# Patient Record
Sex: MALE | Race: BLACK OR AFRICAN AMERICAN | NOT HISPANIC OR LATINO | Employment: OTHER | URBAN - METROPOLITAN AREA
[De-identification: names, ages, dates, MRNs, and addresses within clinical notes are randomized per-mention and may not be internally consistent; named-entity substitution may affect disease eponyms.]

---

## 2019-04-22 ENCOUNTER — OFFICE VISIT (OUTPATIENT)
Dept: WOUND CARE | Facility: CLINIC | Age: 64
End: 2019-04-22
Payer: MEDICARE

## 2019-04-22 VITALS — BODY MASS INDEX: 19.53 KG/M2 | WEIGHT: 136.44 LBS | HEIGHT: 70 IN

## 2019-04-22 DIAGNOSIS — L91.8 HYPERTROPHIC GRANULATION TISSUE: Primary | ICD-10-CM

## 2019-04-22 DIAGNOSIS — M54.9 BACK PAIN, UNSPECIFIED BACK LOCATION, UNSPECIFIED BACK PAIN LATERALITY, UNSPECIFIED CHRONICITY: ICD-10-CM

## 2019-04-22 DIAGNOSIS — Z43.3 ATTENTION TO COLOSTOMY: ICD-10-CM

## 2019-04-22 PROCEDURE — 3008F BODY MASS INDEX DOCD: CPT | Mod: CPTII,S$GLB,, | Performed by: CLINICAL NURSE SPECIALIST

## 2019-04-22 PROCEDURE — 99999 PR PBB SHADOW E&M-NEW PATIENT-LVL II: CPT | Mod: PBBFAC,,, | Performed by: CLINICAL NURSE SPECIALIST

## 2019-04-22 PROCEDURE — 3008F PR BODY MASS INDEX (BMI) DOCUMENTED: ICD-10-PCS | Mod: CPTII,S$GLB,, | Performed by: CLINICAL NURSE SPECIALIST

## 2019-04-22 PROCEDURE — 99999 PR PBB SHADOW E&M-NEW PATIENT-LVL II: ICD-10-PCS | Mod: PBBFAC,,, | Performed by: CLINICAL NURSE SPECIALIST

## 2019-04-22 PROCEDURE — 99203 OFFICE O/P NEW LOW 30 MIN: CPT | Mod: S$GLB,,, | Performed by: CLINICAL NURSE SPECIALIST

## 2019-04-22 PROCEDURE — 99203 PR OFFICE/OUTPT VISIT, NEW, LEVL III, 30-44 MIN: ICD-10-PCS | Mod: S$GLB,,, | Performed by: CLINICAL NURSE SPECIALIST

## 2019-04-22 NOTE — PROGRESS NOTES
"Subjective:       Patient ID: Fran Negrete is a 63 y.o. male.    Chief Complaint: Colostomy    This is new pt to [facility], [facility], had surgery originally 2013 for rectal cancer and this led to other complications and surgeries. and now has a perm colostomy, wounds are well healed. He is very pleasant and appreciative, wants to get his care here, he states, comes with a lot of paper copies. He is presently under care of Dr dumont , ([facility])    Review of Systems   Constitutional: Negative for activity change, appetite change and fever.   HENT: Negative.    Respiratory: Negative.    Cardiovascular: Negative.    Gastrointestinal: Negative for constipation.   Genitourinary: Negative.    Musculoskeletal: Negative.    Skin: Negative.        Objective:      Physical Exam   Constitutional: He appears well-developed.   Very thin but fit   Pulmonary/Chest: Effort normal and breath sounds normal.   Abdominal: Soft. Bowel sounds are normal.   Musculoskeletal: Normal range of motion.       4/22 he does have 2 very firm granulomas at base of stoma that are affecting his pouching and do hurt, topical lidocaine did not provide enough relief for me to cut these. Or cauterize,  He is seeing Dr Dumont this week so I have sent a note to see if she can remove in office  I applied Smaller pouch cut 3/4 " stoma, gave him few pouches already cut and he has olga new image at home but was cutting too big  [facility] for [facility]    Assessment:       1. Hypertrophic granulation tissue    2. Attention to colostomy    3. Back pain, unspecified back location, unspecified back pain laterality, unspecified chronicity        Plan:       Resized and fitted for smaller cut opening,   Request surgeon remove the very firm and large growths at base  Pt to return in few weeks   I have reviewed the plan of care with the patient  and he express understanding. I spent over 50% " of this 30 minute visit in face to face counseling.

## 2019-05-08 ENCOUNTER — OFFICE VISIT (OUTPATIENT)
Dept: WOUND CARE | Facility: CLINIC | Age: 64
End: 2019-05-08
Payer: MEDICARE

## 2019-05-08 DIAGNOSIS — Z43.3 ATTENTION TO COLOSTOMY: Primary | ICD-10-CM

## 2019-05-08 PROCEDURE — 99213 PR OFFICE/OUTPT VISIT, EST, LEVL III, 20-29 MIN: ICD-10-PCS | Mod: S$GLB,,, | Performed by: CLINICAL NURSE SPECIALIST

## 2019-05-08 PROCEDURE — 99999 PR PBB SHADOW E&M-EST. PATIENT-LVL II: CPT | Mod: PBBFAC,,, | Performed by: CLINICAL NURSE SPECIALIST

## 2019-05-08 PROCEDURE — 99213 OFFICE O/P EST LOW 20 MIN: CPT | Mod: S$GLB,,, | Performed by: CLINICAL NURSE SPECIALIST

## 2019-05-08 PROCEDURE — 99999 PR PBB SHADOW E&M-EST. PATIENT-LVL II: ICD-10-PCS | Mod: PBBFAC,,, | Performed by: CLINICAL NURSE SPECIALIST

## 2019-05-08 NOTE — PROGRESS NOTES
"Subjective:       Patient ID: Fran Negrete is a 63 y.o. male.    Chief Complaint: Colostomy    This is a fu  to Enterostomal Therapy clinic, and he had his issues resolved by Dr Dumont as we discussed, so back for another eval of colostomy needs.   PMH  [facility] is [facility] who is [facility] to other providers, had surgery originally 2013 for rectal cancer and this led to other complications and surgeries. and now has a perm colostomy, wounds are well healed. He is very pleasant and appreciative, wants to get his care here, he states, comes with a lot of paper copies. He is presently under care of Dr dumont , ([facility])    Review of Systems   Constitutional: Negative for activity change, appetite change and fever.   HENT: Negative.    Respiratory: Negative.    Cardiovascular: Negative.    Gastrointestinal: Negative for constipation.   Genitourinary: Negative.    Musculoskeletal: Negative.    Skin: Negative.        Objective:      Physical Exam   Constitutional: He appears well-developed.   Very thin but fit   Pulmonary/Chest: Effort normal and breath sounds normal.   Abdominal: Soft. Bowel sounds are normal.   Musculoskeletal: Normal range of motion.       4/22 5/8/19 post removal of granulomas      I applied pouch cut 3/4 " stoma, he has some at home and says [facility] him, gave him numbers [id number]   He plans to get [facility] for now but if cannot to call me and I will send script to [facility]    Assessment:       1. Attention to colostomy        Plan:       Resized and fitted for smaller cut opening,   FU as needed,  I have reviewed the plan of care with the patient  and he express understanding. I spent over 50% of this 15 minute visit in face to face counseling.       "

## 2020-05-06 PROBLEM — Z43.3 ATTENTION TO COLOSTOMY: Status: RESOLVED | Noted: 2019-04-22 | Resolved: 2020-05-06

## 2020-09-21 ENCOUNTER — TELEPHONE (OUTPATIENT)
Dept: WOUND CARE | Facility: CLINIC | Age: 65
End: 2020-09-21

## 2020-09-21 NOTE — TELEPHONE ENCOUNTER
----- Message from Lucy Jacome sent at 9/21/2020 11:49 AM CDT -----  Contact: Pt  @ [phone]  Pt would like to receive a call from [place].       Please contact Pt @ Home [phone] or Cell [phone]

## 2020-09-23 ENCOUNTER — TELEPHONE (OUTPATIENT)
Dept: WOUND CARE | Facility: CLINIC | Age: 65
End: 2020-09-23

## 2020-09-23 NOTE — TELEPHONE ENCOUNTER
Spoke with pt who is [facility] , he is being worked up for PVD there and advised to have a colonoscopy , he had questions about this today, we spoke about his current issues ,will [facility] at [facility] for now

## 2021-07-20 ENCOUNTER — TELEPHONE (OUTPATIENT)
Dept: ENDOSCOPY | Facility: HOSPITAL | Age: 66
End: 2021-07-20

## 2021-07-20 ENCOUNTER — OFFICE VISIT (OUTPATIENT)
Dept: WOUND CARE | Facility: CLINIC | Age: 66
End: 2021-07-20
Payer: MEDICARE

## 2021-07-20 DIAGNOSIS — Z85.048 HISTORY OF RECTAL CANCER: ICD-10-CM

## 2021-07-20 DIAGNOSIS — L92.9 GRANULOMA, SKIN: ICD-10-CM

## 2021-07-20 DIAGNOSIS — Z43.3 ATTENTION TO COLOSTOMY: Primary | ICD-10-CM

## 2021-07-20 PROCEDURE — 99214 OFFICE O/P EST MOD 30 MIN: CPT | Mod: S$GLB,,, | Performed by: CLINICAL NURSE SPECIALIST

## 2021-07-20 PROCEDURE — 99999 PR PBB SHADOW E&M-EST. PATIENT-LVL I: ICD-10-PCS | Mod: PBBFAC,,, | Performed by: CLINICAL NURSE SPECIALIST

## 2021-07-20 PROCEDURE — 99214 PR OFFICE/OUTPT VISIT, EST, LEVL IV, 30-39 MIN: ICD-10-PCS | Mod: S$GLB,,, | Performed by: CLINICAL NURSE SPECIALIST

## 2021-07-20 PROCEDURE — 99999 PR PBB SHADOW E&M-EST. PATIENT-LVL I: CPT | Mod: PBBFAC,,, | Performed by: CLINICAL NURSE SPECIALIST

## 2021-07-20 RX ORDER — OMEPRAZOLE 20 MG/1
20 CAPSULE, DELAYED RELEASE ORAL
COMMUNITY

## 2021-07-20 RX ORDER — NAPROXEN SODIUM 220 MG/1
TABLET, FILM COATED ORAL
COMMUNITY
Start: 2021-03-10

## 2021-07-20 RX ORDER — TADALAFIL 5 MG/1
1 TABLET ORAL DAILY
COMMUNITY
Start: 2021-05-14

## 2021-07-20 RX ORDER — VITAMIN E 268 MG
CAPSULE ORAL
COMMUNITY
Start: 2020-09-04

## 2021-07-20 RX ORDER — ATORVASTATIN CALCIUM 40 MG/1
TABLET, FILM COATED ORAL
COMMUNITY
Start: 2021-05-28

## 2021-08-23 DIAGNOSIS — Z01.812 PRE-PROCEDURE LAB EXAM: ICD-10-CM

## 2021-08-31 ENCOUNTER — PATIENT MESSAGE (OUTPATIENT)
Dept: ENDOSCOPY | Facility: HOSPITAL | Age: 66
End: 2021-08-31

## 2021-09-08 ENCOUNTER — TELEPHONE (OUTPATIENT)
Dept: ENDOSCOPY | Facility: HOSPITAL | Age: 66
End: 2021-09-08

## 2021-09-13 ENCOUNTER — TELEPHONE (OUTPATIENT)
Dept: ENDOSCOPY | Facility: HOSPITAL | Age: 66
End: 2021-09-13

## 2021-09-29 DIAGNOSIS — Z01.812 PRE-PROCEDURE LAB EXAM: ICD-10-CM

## 2021-10-15 ENCOUNTER — ANESTHESIA EVENT (OUTPATIENT)
Dept: ENDOSCOPY | Facility: HOSPITAL | Age: 66
End: 2021-10-15
Payer: MEDICARE

## 2021-10-15 ENCOUNTER — ANESTHESIA (OUTPATIENT)
Dept: ENDOSCOPY | Facility: HOSPITAL | Age: 66
End: 2021-10-15
Payer: MEDICARE

## 2021-10-15 ENCOUNTER — HOSPITAL ENCOUNTER (OUTPATIENT)
Facility: HOSPITAL | Age: 66
Discharge: HOME | End: 2021-10-15
Attending: COLON & RECTAL SURGERY | Admitting: COLON & RECTAL SURGERY
Payer: MEDICARE

## 2021-10-15 VITALS
DIASTOLIC BLOOD PRESSURE: 75 MMHG | OXYGEN SATURATION: 100 % | TEMPERATURE: 98 F | HEART RATE: 70 BPM | BODY MASS INDEX: 16.66 KG/M2 | HEIGHT: 71 IN | WEIGHT: 119 LBS | SYSTOLIC BLOOD PRESSURE: 121 MMHG | RESPIRATION RATE: 20 BRPM

## 2021-10-15 DIAGNOSIS — Z85.048 HISTORY OF RECTAL CANCER: Primary | ICD-10-CM

## 2021-10-15 DIAGNOSIS — C20 RECTAL CANCER: ICD-10-CM

## 2021-10-15 PROCEDURE — 37000009 HC ANESTHESIA EA ADD 15 MINS: Performed by: COLON & RECTAL SURGERY

## 2021-10-15 PROCEDURE — 44394 COLONOSCOPY W/SNARE: CPT | Mod: PT,,, | Performed by: COLON & RECTAL SURGERY

## 2021-10-15 PROCEDURE — 88305 TISSUE EXAM BY PATHOLOGIST: ICD-10-PCS | Mod: 26,,, | Performed by: PATHOLOGY

## 2021-10-15 PROCEDURE — E9220 PRA ENDO ANESTHESIA: HCPCS | Mod: PT,,, | Performed by: NURSE ANESTHETIST, CERTIFIED REGISTERED

## 2021-10-15 PROCEDURE — 27201089 HC SNARE, DISP (ANY): Performed by: COLON & RECTAL SURGERY

## 2021-10-15 PROCEDURE — 63600175 PHARM REV CODE 636 W HCPCS: Performed by: NURSE ANESTHETIST, CERTIFIED REGISTERED

## 2021-10-15 PROCEDURE — 63600175 PHARM REV CODE 636 W HCPCS: Performed by: ANESTHESIOLOGY

## 2021-10-15 PROCEDURE — 25000003 PHARM REV CODE 250: Performed by: NURSE ANESTHETIST, CERTIFIED REGISTERED

## 2021-10-15 PROCEDURE — E9220 PRA ENDO ANESTHESIA: ICD-10-PCS | Mod: PT,,, | Performed by: NURSE ANESTHETIST, CERTIFIED REGISTERED

## 2021-10-15 PROCEDURE — 25000003 PHARM REV CODE 250: Performed by: COLON & RECTAL SURGERY

## 2021-10-15 PROCEDURE — 88305 TISSUE EXAM BY PATHOLOGIST: CPT | Performed by: PATHOLOGY

## 2021-10-15 PROCEDURE — 88305 TISSUE EXAM BY PATHOLOGIST: CPT | Mod: 26,,, | Performed by: PATHOLOGY

## 2021-10-15 PROCEDURE — 44394 COLONOSCOPY W/SNARE: CPT | Performed by: COLON & RECTAL SURGERY

## 2021-10-15 PROCEDURE — 37000008 HC ANESTHESIA 1ST 15 MINUTES: Performed by: COLON & RECTAL SURGERY

## 2021-10-15 PROCEDURE — 44394: ICD-10-PCS | Mod: PT,,, | Performed by: COLON & RECTAL SURGERY

## 2021-10-15 RX ORDER — LIDOCAINE HYDROCHLORIDE 20 MG/ML
INJECTION INTRAVENOUS
Status: DISCONTINUED | OUTPATIENT
Start: 2021-10-15 | End: 2021-10-15

## 2021-10-15 RX ORDER — HYDROCODONE BITARTRATE AND ACETAMINOPHEN 5; 325 MG/1; MG/1
1 TABLET ORAL EVERY 6 HOURS PRN
Qty: 20 TABLET | Refills: 0 | Status: SHIPPED | OUTPATIENT
Start: 2021-10-15 | End: 2023-03-01 | Stop reason: SDUPTHER

## 2021-10-15 RX ORDER — PROPOFOL 10 MG/ML
VIAL (ML) INTRAVENOUS
Status: DISCONTINUED | OUTPATIENT
Start: 2021-10-15 | End: 2021-10-15

## 2021-10-15 RX ORDER — FENTANYL CITRATE 50 UG/ML
25 INJECTION, SOLUTION INTRAMUSCULAR; INTRAVENOUS EVERY 5 MIN PRN
Status: DISCONTINUED | OUTPATIENT
Start: 2021-10-15 | End: 2021-10-15 | Stop reason: HOSPADM

## 2021-10-15 RX ORDER — SODIUM CHLORIDE 9 MG/ML
INJECTION, SOLUTION INTRAVENOUS CONTINUOUS
Status: DISCONTINUED | OUTPATIENT
Start: 2021-10-15 | End: 2021-10-15 | Stop reason: HOSPADM

## 2021-10-15 RX ORDER — PROPOFOL 10 MG/ML
VIAL (ML) INTRAVENOUS CONTINUOUS PRN
Status: DISCONTINUED | OUTPATIENT
Start: 2021-10-15 | End: 2021-10-15

## 2021-10-15 RX ADMIN — SODIUM CHLORIDE: 0.9 INJECTION, SOLUTION INTRAVENOUS at 11:10

## 2021-10-15 RX ADMIN — PROPOFOL 50 MG: 10 INJECTION, EMULSION INTRAVENOUS at 10:10

## 2021-10-15 RX ADMIN — SODIUM CHLORIDE: 0.9 INJECTION, SOLUTION INTRAVENOUS at 09:10

## 2021-10-15 RX ADMIN — FENTANYL CITRATE 25 MCG: 50 INJECTION INTRAMUSCULAR; INTRAVENOUS at 11:10

## 2021-10-15 RX ADMIN — LIDOCAINE HYDROCHLORIDE 50 MG: 20 INJECTION, SOLUTION INTRAVENOUS at 10:10

## 2021-10-15 RX ADMIN — Medication 150 MCG/KG/MIN: at 10:10

## 2021-10-22 LAB
FINAL PATHOLOGIC DIAGNOSIS: NORMAL
Lab: NORMAL

## 2022-01-31 ENCOUNTER — PATIENT MESSAGE (OUTPATIENT)
Dept: SURGERY | Facility: CLINIC | Age: 67
End: 2022-01-31
Payer: OTHER GOVERNMENT

## 2022-01-31 ENCOUNTER — OFFICE VISIT (OUTPATIENT)
Dept: WOUND CARE | Facility: CLINIC | Age: 67
End: 2022-01-31
Payer: MEDICARE

## 2022-01-31 DIAGNOSIS — Z85.048 HISTORY OF RECTAL CANCER: ICD-10-CM

## 2022-01-31 DIAGNOSIS — R10.9 ABDOMINAL PAIN, UNSPECIFIED ABDOMINAL LOCATION: ICD-10-CM

## 2022-01-31 DIAGNOSIS — K94.03 COLOSTOMY DYSFUNCTION: Primary | ICD-10-CM

## 2022-01-31 DIAGNOSIS — L92.9 GRANULOMA, SKIN: ICD-10-CM

## 2022-01-31 DIAGNOSIS — K94.09 SKIN ULCERATION AT COLOSTOMY SITE: Primary | ICD-10-CM

## 2022-01-31 PROCEDURE — 1159F MED LIST DOCD IN RCRD: CPT | Mod: CPTII,S$GLB,, | Performed by: CLINICAL NURSE SPECIALIST

## 2022-01-31 PROCEDURE — 99214 PR OFFICE/OUTPT VISIT, EST, LEVL IV, 30-39 MIN: ICD-10-PCS | Mod: S$GLB,,, | Performed by: CLINICAL NURSE SPECIALIST

## 2022-01-31 PROCEDURE — 99999 PR PBB SHADOW E&M-EST. PATIENT-LVL II: ICD-10-PCS | Mod: PBBFAC,,, | Performed by: CLINICAL NURSE SPECIALIST

## 2022-01-31 PROCEDURE — 99214 OFFICE O/P EST MOD 30 MIN: CPT | Mod: S$GLB,,, | Performed by: CLINICAL NURSE SPECIALIST

## 2022-01-31 PROCEDURE — 1160F PR REVIEW ALL MEDS BY PRESCRIBER/CLIN PHARMACIST DOCUMENTED: ICD-10-PCS | Mod: CPTII,S$GLB,, | Performed by: CLINICAL NURSE SPECIALIST

## 2022-01-31 PROCEDURE — 99999 PR PBB SHADOW E&M-EST. PATIENT-LVL II: CPT | Mod: PBBFAC,,, | Performed by: CLINICAL NURSE SPECIALIST

## 2022-01-31 PROCEDURE — 1160F RVW MEDS BY RX/DR IN RCRD: CPT | Mod: CPTII,S$GLB,, | Performed by: CLINICAL NURSE SPECIALIST

## 2022-01-31 PROCEDURE — 1159F PR MEDICATION LIST DOCUMENTED IN MEDICAL RECORD: ICD-10-PCS | Mod: CPTII,S$GLB,, | Performed by: CLINICAL NURSE SPECIALIST

## 2022-01-31 RX ORDER — LIDOCAINE HYDROCHLORIDE 20 MG/ML
JELLY TOPICAL
Qty: 60 ML | Refills: 0 | Status: SHIPPED | OUTPATIENT
Start: 2022-01-31 | End: 2022-03-02

## 2022-01-31 NOTE — Clinical Note
Hey this needs attention , you removed prev granuloma in Oct , first time back to day   very painful,

## 2022-01-31 NOTE — PROGRESS NOTES
Subjective:       Patient ID: Fran Negrete is a 66 y.o. male.    Chief Complaint: Colostomy    This is a fu  to Enterostomal Therapy clinic in [place] , then had removal of granuloma by Dr Patel during a scope, and it was benign  He is now having issues with his colostomy again so here for another eval of colostomy needs.     PMH  [facility] is [facility] who is [facility] to other [facility], had surgery originally 2013 for rectal cancer and this led to other complications and surgeries. and now has a perm colostomy, wounds are well healed. He is very pleasant and appreciative, wants to get his care here, he states, comes with a lot of paper copies. He is presently under care of Dr davis , ([facility])    Review of Systems   Constitutional: Negative for activity change, appetite change and fever.   HENT: Negative.    Respiratory: Negative.    Cardiovascular: Negative.    Gastrointestinal: Negative for constipation.   Genitourinary: Negative.    Musculoskeletal: Negative.    Skin: Negative.        Objective:      Physical Exam  Constitutional:       Appearance: He is well-developed.      Comments: Very thin but fit   Pulmonary:      Effort: Pulmonary effort is normal.      Breath sounds: Normal breath sounds.   Abdominal:      General: Bowel sounds are normal.      Palpations: Abdomen is soft.   Musculoskeletal:         General: Normal range of motion.         1/31/22   Can see new growth on stoma and needs to be assessed and biopsied and removed, very painful to lightest touch   Will send him back to Dr Jorge kidd    ________________________________________________________________________________________________  2021 4/22/19 5/8/19 post removal of granulomas         July 2021  Large granuloma at base of stoma  TODAY  He has had a large regrowth of the granuloma at base of stoma, very painful to touch as well, I discussed with Dr Patel and best option is to have a scope which he is overdue for and then Dr DARIEN hernandez  remove while he is asleep for scope   I applied lidocaine topically today and he said it felt so much better,  Wearing new image       Assessment:       1. Skin ulceration at colostomy site    2. History of rectal cancer    3. Granuloma, skin        Plan:     fu with Dr Jorge kidd  Needs removal of regrowth   FU as needed,  I have reviewed the plan of care with the patient  and he express understanding. I spent over 50% of this 30  minute visit in face to face counseling.

## 2022-02-01 ENCOUNTER — TELEPHONE (OUTPATIENT)
Dept: SURGERY | Facility: CLINIC | Age: 67
End: 2022-02-01
Payer: OTHER GOVERNMENT

## 2022-02-01 NOTE — TELEPHONE ENCOUNTER
Left voicemail with callback number in reference to upcoming appointment. Instructed to call back.

## 2022-02-02 ENCOUNTER — TELEPHONE (OUTPATIENT)
Dept: SURGERY | Facility: CLINIC | Age: 67
End: 2022-02-02
Payer: OTHER GOVERNMENT

## 2022-02-02 NOTE — TELEPHONE ENCOUNTER
Spoke with patient regarding rescheduling appointement due to transportation. Appointment details confirmed verbally with patient. Reminder slip placed in mail.

## 2022-02-02 NOTE — TELEPHONE ENCOUNTER
----- Message from Clarita Gagnon sent at 2/2/2022  2:58 PM CST -----  Regarding: appointment  Contact: Pt @ [phone]  Patient received a call from office about appointment on Thursday. Because of transportation issues, patient is not able to make appointment. Patient would like a call back from 's office before he cancels.    Pt @ [phone]

## 2022-02-09 ENCOUNTER — TELEPHONE (OUTPATIENT)
Dept: SURGERY | Facility: CLINIC | Age: 67
End: 2022-02-09
Payer: OTHER GOVERNMENT

## 2022-02-10 ENCOUNTER — HOSPITAL ENCOUNTER (OUTPATIENT)
Dept: RADIOLOGY | Facility: OTHER | Age: 67
Discharge: HOME | End: 2022-02-10
Attending: COLON & RECTAL SURGERY
Payer: MEDICARE

## 2022-02-10 DIAGNOSIS — K94.03 COLOSTOMY DYSFUNCTION: ICD-10-CM

## 2022-02-10 DIAGNOSIS — R10.9 ABDOMINAL PAIN, UNSPECIFIED ABDOMINAL LOCATION: ICD-10-CM

## 2022-02-10 PROCEDURE — 74177 CT ABD & PELVIS W/CONTRAST: CPT | Mod: 26,,, | Performed by: RADIOLOGY

## 2022-02-10 PROCEDURE — A9698 NON-RAD CONTRAST MATERIALNOC: HCPCS | Performed by: COLON & RECTAL SURGERY

## 2022-02-10 PROCEDURE — 74177 CT ABD & PELVIS W/CONTRAST: CPT | Mod: TC

## 2022-02-10 PROCEDURE — 25500020 PHARM REV CODE 255: Performed by: COLON & RECTAL SURGERY

## 2022-02-10 PROCEDURE — 74177 CT ABDOMEN PELVIS WITH CONTRAST: ICD-10-PCS | Mod: 26,,, | Performed by: RADIOLOGY

## 2022-02-10 RX ADMIN — IOHEXOL 500 ML: 9 SOLUTION ORAL at 01:02

## 2022-02-10 RX ADMIN — IOHEXOL 75 ML: 350 INJECTION, SOLUTION INTRAVENOUS at 01:02

## 2022-02-11 ENCOUNTER — OFFICE VISIT (OUTPATIENT)
Dept: SURGERY | Facility: CLINIC | Age: 67
End: 2022-02-11
Payer: MEDICARE

## 2022-02-11 VITALS
HEART RATE: 85 BPM | WEIGHT: 117.31 LBS | SYSTOLIC BLOOD PRESSURE: 134 MMHG | HEIGHT: 71 IN | BODY MASS INDEX: 16.42 KG/M2 | DIASTOLIC BLOOD PRESSURE: 89 MMHG

## 2022-02-11 DIAGNOSIS — K94.03 COLOSTOMY DYSFUNCTION: Primary | ICD-10-CM

## 2022-02-11 DIAGNOSIS — Z01.818 PRE-OP TESTING: ICD-10-CM

## 2022-02-11 PROCEDURE — 99999 PR PBB SHADOW E&M-EST. PATIENT-LVL III: CPT | Mod: PBBFAC,,, | Performed by: COLON & RECTAL SURGERY

## 2022-02-11 PROCEDURE — 1160F PR REVIEW ALL MEDS BY PRESCRIBER/CLIN PHARMACIST DOCUMENTED: ICD-10-PCS | Mod: CPTII,S$GLB,, | Performed by: COLON & RECTAL SURGERY

## 2022-02-11 PROCEDURE — 1160F RVW MEDS BY RX/DR IN RCRD: CPT | Mod: CPTII,S$GLB,, | Performed by: COLON & RECTAL SURGERY

## 2022-02-11 PROCEDURE — 3075F SYST BP GE 130 - 139MM HG: CPT | Mod: CPTII,S$GLB,, | Performed by: COLON & RECTAL SURGERY

## 2022-02-11 PROCEDURE — 3075F PR MOST RECENT SYSTOLIC BLOOD PRESS GE 130-139MM HG: ICD-10-PCS | Mod: CPTII,S$GLB,, | Performed by: COLON & RECTAL SURGERY

## 2022-02-11 PROCEDURE — 99214 PR OFFICE/OUTPT VISIT, EST, LEVL IV, 30-39 MIN: ICD-10-PCS | Mod: S$GLB,,, | Performed by: COLON & RECTAL SURGERY

## 2022-02-11 PROCEDURE — 1159F MED LIST DOCD IN RCRD: CPT | Mod: CPTII,S$GLB,, | Performed by: COLON & RECTAL SURGERY

## 2022-02-11 PROCEDURE — 3008F PR BODY MASS INDEX (BMI) DOCUMENTED: ICD-10-PCS | Mod: CPTII,S$GLB,, | Performed by: COLON & RECTAL SURGERY

## 2022-02-11 PROCEDURE — 3008F BODY MASS INDEX DOCD: CPT | Mod: CPTII,S$GLB,, | Performed by: COLON & RECTAL SURGERY

## 2022-02-11 PROCEDURE — 99999 PR PBB SHADOW E&M-EST. PATIENT-LVL III: ICD-10-PCS | Mod: PBBFAC,,, | Performed by: COLON & RECTAL SURGERY

## 2022-02-11 PROCEDURE — 3079F DIAST BP 80-89 MM HG: CPT | Mod: CPTII,S$GLB,, | Performed by: COLON & RECTAL SURGERY

## 2022-02-11 PROCEDURE — 99214 OFFICE O/P EST MOD 30 MIN: CPT | Mod: S$GLB,,, | Performed by: COLON & RECTAL SURGERY

## 2022-02-11 PROCEDURE — 3079F PR MOST RECENT DIASTOLIC BLOOD PRESSURE 80-89 MM HG: ICD-10-PCS | Mod: CPTII,S$GLB,, | Performed by: COLON & RECTAL SURGERY

## 2022-02-11 PROCEDURE — 1159F PR MEDICATION LIST DOCUMENTED IN MEDICAL RECORD: ICD-10-PCS | Mod: CPTII,S$GLB,, | Performed by: COLON & RECTAL SURGERY

## 2022-02-11 RX ORDER — POLYETHYLENE GLYCOL 3350 17 G/17G
POWDER, FOR SOLUTION ORAL
Qty: 280 G | Refills: 0 | Status: SHIPPED | OUTPATIENT
Start: 2022-02-11 | End: 2023-01-06

## 2022-02-11 RX ORDER — NEOMYCIN SULFATE 500 MG/1
TABLET ORAL
Qty: 6 TABLET | Refills: 0 | Status: SHIPPED | OUTPATIENT
Start: 2022-02-11

## 2022-02-11 RX ORDER — METRONIDAZOLE 500 MG/1
500 TABLET ORAL 3 TIMES DAILY
Qty: 3 TABLET | Refills: 0 | Status: SHIPPED | OUTPATIENT
Start: 2022-02-11 | End: 2022-02-12

## 2022-02-11 NOTE — PROGRESS NOTES
CRS Office Visit Follow-up  Referring Md:   Aaareferral Self  No address on file    SUBJECTIVE:     Chief Complaint: colostomy dysfunction    History of Present Illness:  Patient is a 66 y.o. male presents with colostomy dysfunction. The patient is a established patient to this practice.     Course is as follows:  [facility]  Initially did a LAR with ileostomy.  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  He then had a completion proctectomy with left colectomy and a proximal to descend colostomy brought out.   the majority of the surgery was performed in [facility].  He had 6 surgeries in [place] for rectal cancer and a total of 13 prior abdominal surgeries.  He presents today for evaluation for stomal dysfunction.  He has narrowing of the stoma opening as well as significant granulation tissue around the stoma that is tender to touch making pouching difficult.  He recalls that a local revision was attempted in the past.  On 10/15/2021, colonoscopy was performed with excision of granulation tissue around the stoma.  This resulted in a 3-4 month improvement.  Functionally, he is independent and performs all of his activities of daily living.  Due to pouching difficulties, he has to change the pouch daily.  He normally has liquid output through his ostomy.  Weight has been stable.  Rarely smokes tobacco.  Does regularly smoke marijuana.    Last Colonoscopy: 10/15/2021  Impression:            - Patent but strictured end colostomy with                          moderate stenosis and granuloma. Granuloma excised                          with hot snare in the mid transverse colon.                          - Non-patent side-to-side ileo-colonic                          anastomosis, characterized by healthy appearing                          mucosa.                          - Three 3 to 6 mm polyps in the transverse colon,                          removed  "with a cold snare. Resected and retrieved.     Review of Systems:  Review of Systems   Constitutional: Negative for chills, diaphoresis, fever, malaise/fatigue and weight loss.   HENT: Negative for congestion.    Respiratory: Negative for shortness of breath.    Cardiovascular: Negative for chest pain and leg swelling.   Gastrointestinal: Positive for abdominal pain and diarrhea. Negative for blood in stool, constipation, nausea and vomiting.   Genitourinary: Negative for dysuria.   Musculoskeletal: Negative for back pain and myalgias.   Skin: Positive for rash.   Neurological: Negative for dizziness and weakness.   Endo/Heme/Allergies: Does not bruise/bleed easily.   Psychiatric/Behavioral: Negative for depression.       OBJECTIVE:     Vital Signs (Most Recent)  /89 (BP Location: Right arm, Patient Position: Sitting, BP Method: Large (Automatic))   Pulse 85   Ht 5' 11" (1.803 m)   Wt 53.2 kg (117 lb 4.6 oz)   BMI 16.36 kg/m²     Physical Exam:  General: Black or  male in no distress   Neuro: alert and oriented x 4.  Moves all extremities.     HEENT: no icterus.  Trachea midline  Respiratory: respirations are even and unlabored  Cardiac: regular rate  Abdomen:  Large midline incision is well healed.  Prior ileostomy site in the right lower quadrant is well healed.  No hernia.  Stoma in the left upper abdomen with stenosis and dense granulation tissue around the stoma that is tender to palpation.  Extremities: Warm dry and intact  Skin: no rashes  Anorectal:  Deferred    Labs: H&H 12 and 38.  Normal renal function.  Alb 3.6.      Imaging: CT abd pelvis from 2/10/22 personally reviewed and shows proximal descending colostomy. Splenic flexure intact.        ASSESSMENT/PLAN:     Diagnoses and all orders for this visit:    Colostomy dysfunction  -     Case Request Operating Room: REVISION, COLOSTOMY, LAPAROSCOPIC        66 y.o. male with dysfunction of a proximal end descending colostomy " following extensive surgery for rectal cancer in 2013 in [place].  Based on the CT, local revision does not appear likely to be successful as the stoma is created from his splenic flexure.  Splenic flexure does not appear to be completely mobilized her well mobilized.  Discussed that he would benefit from a redo colostomy with mobilization of the splenic flexure.  Ideally, this could be attempted laparoscopically.  Discussed that he has a high likelihood of needing a midline laparotomy due to the multiple past surgeries.  However, laparoscopy could be attempted 1st.  Discussed the risks to include hernia, wound infection, pain, and recurrence.  Consents were signed today and all questions were answered.  Will plan to access the abdomen a Parmar trocar at the umbilicus.  On review of the CT, there is not appear to be a window in the right upper quadrant for placement of a Veress needle.    DENSIE Patel MD, FACS, FASCRS  Staff Surgeon  Colon & Rectal Surgery

## 2022-02-15 DIAGNOSIS — K94.03 COLOSTOMY DYSFUNCTION: Primary | ICD-10-CM

## 2022-02-22 ENCOUNTER — TELEPHONE (OUTPATIENT)
Dept: SURGERY | Facility: CLINIC | Age: 67
End: 2022-02-22
Payer: OTHER GOVERNMENT

## 2022-02-22 DIAGNOSIS — K94.03 COLOSTOMY DYSFUNCTION: Primary | ICD-10-CM

## 2022-02-22 RX ORDER — TRAMADOL HYDROCHLORIDE 50 MG/1
100 TABLET ORAL EVERY 6 HOURS
Qty: 40 TABLET | Refills: 0 | Status: SHIPPED | OUTPATIENT
Start: 2022-02-22

## 2022-02-22 NOTE — TELEPHONE ENCOUNTER
----- Message from Teresa Pastrana sent at 2/22/2022  3:58 PM CST -----  Regarding: call back  Contact: pt  Pt requesting a call back from nurse in regards to stomach pain.      Pt @ [phone]

## 2022-02-22 NOTE — TELEPHONE ENCOUNTER
Spoke with patient regarding pain and requesting pain medicine. Instructed patient that I will ask Dr. Patel  About pain meds but if his pain is this severe, he needs to go to the emergency room. Patient verbalizes understanding.

## 2022-02-22 NOTE — PROGRESS NOTES
Called patient.  He is having significant pain around his ostomy.  Refractory to Tylenol and ibuprofen.  Patient does not want to eat secondary to abdominal pain.  Will trial Ultram to see if he is able to get any benefit.  Prescription called in.    DENISE Patel MD, FACS, FASCRS  Staff Surgeon  Colon & Rectal Surgery

## 2022-03-04 ENCOUNTER — TELEPHONE (OUTPATIENT)
Dept: SURGERY | Facility: CLINIC | Age: 67
End: 2022-03-04
Payer: OTHER GOVERNMENT

## 2022-03-04 DIAGNOSIS — K94.03 COLOSTOMY DYSFUNCTION: Primary | ICD-10-CM

## 2022-03-07 ENCOUNTER — TELEPHONE (OUTPATIENT)
Dept: SURGERY | Facility: HOSPITAL | Age: 67
End: 2022-03-07
Payer: OTHER GOVERNMENT

## 2022-03-07 NOTE — TELEPHONE ENCOUNTER
Multiple phone calls were attempted to the patient to contact him regarding his surgery for today for revision of his colostomy.  House phone was disconnected.  Mobile phone went to voicemail.  Daughter's phone was also called but did not answer.  Case was therefore canceled.  He did not show up for his scheduled procedure.        DENISE Patel MD, FACS, FASCRS  Staff Surgeon  Colon & Rectal Surgery

## 2022-08-02 DIAGNOSIS — Z01.89 ENCOUNTER FOR OTHER SPECIFIED SPECIAL EXAMINATIONS: Primary | ICD-10-CM

## 2022-08-09 ENCOUNTER — HOSPITAL ENCOUNTER (OUTPATIENT)
Dept: RADIOLOGY | Facility: HOSPITAL | Age: 67
Discharge: HOME | End: 2022-08-09
Attending: STUDENT IN AN ORGANIZED HEALTH CARE EDUCATION/TRAINING PROGRAM
Payer: OTHER GOVERNMENT

## 2022-08-09 DIAGNOSIS — Z01.89 ENCOUNTER FOR OTHER SPECIFIED SPECIAL EXAMINATIONS: ICD-10-CM

## 2022-08-09 PROCEDURE — A9585 GADOBUTROL INJECTION: HCPCS

## 2022-08-09 PROCEDURE — 72158 MRI LUMBAR SPINE W WO CONTRAST: ICD-10-PCS | Mod: 26,,, | Performed by: INTERNAL MEDICINE

## 2022-08-09 PROCEDURE — 72158 MRI LUMBAR SPINE W/O & W/DYE: CPT | Mod: 26,,, | Performed by: INTERNAL MEDICINE

## 2022-08-09 PROCEDURE — 72158 MRI LUMBAR SPINE W/O & W/DYE: CPT | Mod: TC

## 2022-08-09 PROCEDURE — 25500020 PHARM REV CODE 255

## 2022-08-09 RX ORDER — GADOBUTROL 604.72 MG/ML
5 INJECTION INTRAVENOUS
Status: COMPLETED | OUTPATIENT
Start: 2022-08-09 | End: 2022-08-09

## 2022-08-09 RX ADMIN — GADOBUTROL 5 ML: 604.72 INJECTION INTRAVENOUS at 07:08

## 2022-12-16 ENCOUNTER — TELEPHONE (OUTPATIENT)
Dept: SURGERY | Facility: CLINIC | Age: 67
End: 2022-12-16
Payer: OTHER GOVERNMENT

## 2022-12-16 NOTE — TELEPHONE ENCOUNTER
----- Message from Ken Atkinson sent at 12/16/2022  2:26 PM CST -----  Contact: @[phone]  Pt is calling in requesting a call back to schedule an appt, please call to discuss further.

## 2022-12-16 NOTE — TELEPHONE ENCOUNTER
Spoke with patient regarding scheduling appointment to see Dr. Patel. Appointment scheduled and reminder slip placed in mail. Message sent to BENITA Cervantes to advise on skin barrier spray.

## 2022-12-21 ENCOUNTER — OFFICE VISIT (OUTPATIENT)
Dept: WOUND CARE | Facility: CLINIC | Age: 67
End: 2022-12-21
Payer: OTHER GOVERNMENT

## 2022-12-21 DIAGNOSIS — Z43.3 ATTENTION TO COLOSTOMY: ICD-10-CM

## 2022-12-21 DIAGNOSIS — L92.9 GRANULOMA, SKIN: Primary | ICD-10-CM

## 2022-12-21 DIAGNOSIS — Z85.048 HISTORY OF RECTAL CANCER: ICD-10-CM

## 2022-12-21 PROCEDURE — 99213 OFFICE O/P EST LOW 20 MIN: CPT | Mod: PBBFAC | Performed by: CLINICAL NURSE SPECIALIST

## 2022-12-21 PROCEDURE — 99214 PR OFFICE/OUTPT VISIT, EST, LEVL IV, 30-39 MIN: ICD-10-PCS | Mod: S$PBB,,, | Performed by: CLINICAL NURSE SPECIALIST

## 2022-12-21 PROCEDURE — 99214 OFFICE O/P EST MOD 30 MIN: CPT | Mod: S$PBB,,, | Performed by: CLINICAL NURSE SPECIALIST

## 2022-12-21 PROCEDURE — 99999 PR PBB SHADOW E&M-EST. PATIENT-LVL III: ICD-10-PCS | Mod: PBBFAC,,, | Performed by: CLINICAL NURSE SPECIALIST

## 2022-12-21 PROCEDURE — 99999 PR PBB SHADOW E&M-EST. PATIENT-LVL III: CPT | Mod: PBBFAC,,, | Performed by: CLINICAL NURSE SPECIALIST

## 2022-12-21 NOTE — PROGRESS NOTES
Subjective:       Patient ID: Fran Negrete is a 67 y.o. male.    Chief Complaint: Colostomy and Skin Problem    This is a fu  for painful granulation below stoma , in past had a removal of granuloma by Dr Patel during a scope, and it was benign  He is now having issues aleisha pain with his colostomy again so here for another eval of colostomy needs.     PMH  [facility] is [facility] who [facility] to other [facility], had surgery originally 2013 for rectal cancer and this led to other complications and surgeries. and now has a perm colostomy, wounds are well healed. He is very pleasant and appreciative, wants to get his care here, he states, comes with a lot of paper copies. He is presently under care of Dr davis , ([facility])    Review of Systems   Constitutional:  Negative for activity change, appetite change and fever.   HENT: Negative.     Respiratory: Negative.     Cardiovascular: Negative.    Gastrointestinal:  Negative for constipation.   Genitourinary: Negative.    Musculoskeletal: Negative.    Skin: Negative.      Objective:      Physical Exam  Constitutional:       Appearance: He is well-developed.      Comments: Very thin but fit   Pulmonary:      Effort: Pulmonary effort is normal.      Breath sounds: Normal breath sounds.   Abdominal:      General: Bowel sounds are normal.      Palpations: Abdomen is soft.   Musculoskeletal:         General: Normal range of motion.       1/31/22 12/21/22     Can see new growth on stoma and needs to be assessed and biopsied and removed, very painful to lightest touch  wearing new image 23/4 flange so I applied barrier cream and stoma powder to the raised painful area before attaching the pouch  gave him supplies to do this     ________________________________________________________________________________________________  2021 4/22/19 5/8/19 post removal of granulomas         July 2021  Large granuloma at base of stoma    He has had a large regrowth of the  granuloma at base of stoma, very painful to touch as well,  Assessment:       1. Granuloma, skin    2. History of rectal cancer    3. Attention to colostomy        Plan:     FU with Dr Patel in 2 weeks   Needs removal of regrowth again   FU as needed,  I have reviewed the plan of care with the patient  and he express understanding. I spent over 50% of this 30  minute visit in face to face counseling.

## 2023-01-05 NOTE — PROGRESS NOTES
CRS Office Visit Follow-up  Referring Md:   No referring provider defined for this encounter.    SUBJECTIVE:     Chief Complaint: colostomy dysfunction    History of Present Illness:  Patient is a 67 y.o. male presents with colostomy dysfunction. The patient is a established patient to this practice.     Course is as follows:  [facility]  Initially did a LAR with ileostomy.  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  He then had a completion proctectomy with left colectomy and a proximal to descend colostomy brought out.   the majority of the surgery was performed in [facility].  He had 6 surgeries in [place] for rectal cancer and a total of 13 prior abdominal surgeries.  He presents today for evaluation for stomal dysfunction.  He has narrowing of the stoma opening as well as significant granulation tissue around the stoma that is tender to touch making pouching difficult.  He recalls that a local revision was attempted in the past.  On 10/15/2021, colonoscopy was performed with excision of granulation tissue around the stoma.  This resulted in a 3-4 month improvement.  Functionally, he is independent and performs all of his activities of daily living.  Due to pouching difficulties, he has to change the pouch daily.  He normally has liquid output through his ostomy.  Weight has been stable.  Rarely smokes tobacco.  Does regularly smoke marijuana.    3/7/22:  Plan for laparoscopic revision of the colostomy.  Patient never showed.    Current status:  1/6/23:  Lost 10 lb since his prior visit in February 2022.  Worsening granulation tissue around the colostomy site resulting in increased pain.  He is having high ostomy output.  Was previously taking Imodium but ran out and did not restart.  Frequently afraid to drink liquids an effort to avoid increased ostomy output.  Remains ambulatory at home.      Last Colonoscopy: 10/15/2021  Impression:           "  - Patent but strictured end colostomy with                          moderate stenosis and granuloma. Granuloma excised                          with hot snare in the mid transverse colon.                          - Non-patent side-to-side ileo-colonic                          anastomosis, characterized by healthy appearing                          mucosa.                          - Three 3 to 6 mm polyps in the transverse colon,                          removed with a cold snare. Resected and retrieved.     Review of Systems:  Review of Systems   Constitutional:  Negative for chills, diaphoresis, fever, malaise/fatigue and weight loss.   HENT:  Negative for congestion.    Respiratory:  Negative for shortness of breath.    Cardiovascular:  Negative for chest pain and leg swelling.   Gastrointestinal:  Positive for abdominal pain and diarrhea. Negative for blood in stool, constipation, nausea and vomiting.   Genitourinary:  Negative for dysuria.   Musculoskeletal:  Negative for back pain and myalgias.   Skin:  Positive for rash.   Neurological:  Negative for dizziness and weakness.   Endo/Heme/Allergies:  Does not bruise/bleed easily.   Psychiatric/Behavioral:  Negative for depression.      OBJECTIVE:     Vital Signs (Most Recent)  /72 (BP Location: Left arm, Patient Position: Sitting, BP Method: Small (Automatic))   Pulse (!) 142   Ht 5' 11" (1.803 m)   Wt 49.8 kg (109 lb 12.6 oz)   BMI 15.31 kg/m²     Physical Exam:  General: Black or  male in no distress   Neuro: alert and oriented x 4.  Moves all extremities.     HEENT: no icterus.  Trachea midline  Respiratory: respirations are even and unlabored  Cardiac: regular rate  Abdomen:  Large midline incision is well healed.  Prior ileostomy site in the right lower quadrant is well healed.  No hernia.  Stoma in the left upper abdomen with stenosis and dense granulation tissue around the stoma that is tender to palpation.      Extremities: " Warm dry and intact  Skin: no rashes  Anorectal:  Deferred    Labs: H&H 13 and 40.  Albumin remains stable at 3.6.  Creatinine 1.1.    Imaging: CT abd pelvis from 2/10/22 personally reviewed and shows proximal descending colostomy. Splenic flexure intact.        ASSESSMENT/PLAN:     Diagnoses and all orders for this visit:    Colostomy dysfunction  -     loperamide (IMODIUM) 2 mg capsule; Take 1 capsule (2 mg total) by mouth 4 (four) times daily.  -     Comprehensive Metabolic Panel; Future  -     CBC Auto Differential; Future  -     Case Request Operating Room: REVISION, COLOSTOMY, JAG rendon          67 y.o. male with dysfunction of a proximal end descending colostomy following extensive surgery for rectal cancer in 2013 in [place].  Based on the CT, local revision does not appear likely to be successful as the stoma is created from his splenic flexure.  Splenic flexure does not appear to be completely mobilized or well mobilized.      Repeat discussion today regarding colostomy revision.  Given the amount of granulation tissue and stenosis around the ostomy, he would likely benefit from midline laparotomy, lysis of adhesions, relocation of the colostomy to the right side of the abdomen, excision of the granulation tissue.  Discussed that this would take around 4 hours.  Discussed that he would be in the hospital for proximally 3-5 days afterwards with a 6 week total recovery.  Discussed the risk of incisional hernia, wound infection, pain high ostomy output.  In the meantime, recommended restarting Imodium.  Labs were checked today his albumin remains normal despite his weight loss.    Did recommend that he go to the ER for fluid resuscitation regarding his tachycardia but he deferred.      DENISE Patel MD, FACS, FASCRS  Staff Surgeon  Colon & Rectal Surgery

## 2023-01-06 ENCOUNTER — OFFICE VISIT (OUTPATIENT)
Dept: SURGERY | Facility: CLINIC | Age: 68
End: 2023-01-06
Payer: OTHER GOVERNMENT

## 2023-01-06 ENCOUNTER — LAB VISIT (OUTPATIENT)
Dept: LAB | Facility: HOSPITAL | Age: 68
End: 2023-01-06
Attending: COLON & RECTAL SURGERY
Payer: OTHER GOVERNMENT

## 2023-01-06 VITALS
HEIGHT: 71 IN | BODY MASS INDEX: 15.37 KG/M2 | HEART RATE: 142 BPM | WEIGHT: 109.81 LBS | DIASTOLIC BLOOD PRESSURE: 72 MMHG | SYSTOLIC BLOOD PRESSURE: 115 MMHG

## 2023-01-06 DIAGNOSIS — K94.03 COLOSTOMY DYSFUNCTION: Primary | ICD-10-CM

## 2023-01-06 DIAGNOSIS — K94.03 COLOSTOMY DYSFUNCTION: ICD-10-CM

## 2023-01-06 LAB
ALBUMIN SERPL BCP-MCNC: 3.6 G/DL (ref 3.5–5.2)
ALP SERPL-CCNC: 59 U/L (ref 55–135)
ALT SERPL W/O P-5'-P-CCNC: 9 U/L (ref 10–44)
ANION GAP SERPL CALC-SCNC: 8 MMOL/L (ref 8–16)
AST SERPL-CCNC: 17 U/L (ref 10–40)
BASOPHILS # BLD AUTO: 0.04 K/UL (ref 0–0.2)
BASOPHILS NFR BLD: 0.8 % (ref 0–1.9)
BILIRUB SERPL-MCNC: 0.6 MG/DL (ref 0.1–1)
BUN SERPL-MCNC: 14 MG/DL (ref 8–23)
CALCIUM SERPL-MCNC: 10.6 MG/DL (ref 8.7–10.5)
CHLORIDE SERPL-SCNC: 104 MMOL/L (ref 95–110)
CO2 SERPL-SCNC: 31 MMOL/L (ref 23–29)
CREAT SERPL-MCNC: 1.1 MG/DL (ref 0.5–1.4)
DIFFERENTIAL METHOD: ABNORMAL
EOSINOPHIL # BLD AUTO: 0.1 K/UL (ref 0–0.5)
EOSINOPHIL NFR BLD: 1.7 % (ref 0–8)
ERYTHROCYTE [DISTWIDTH] IN BLOOD BY AUTOMATED COUNT: 14.9 % (ref 11.5–14.5)
EST. GFR  (NO RACE VARIABLE): >60 ML/MIN/1.73 M^2
GLUCOSE SERPL-MCNC: 73 MG/DL (ref 70–110)
HCT VFR BLD AUTO: 39.5 % (ref 40–54)
HGB BLD-MCNC: 12.6 G/DL (ref 14–18)
IMM GRANULOCYTES # BLD AUTO: 0.01 K/UL (ref 0–0.04)
IMM GRANULOCYTES NFR BLD AUTO: 0.2 % (ref 0–0.5)
LYMPHOCYTES # BLD AUTO: 2.1 K/UL (ref 1–4.8)
LYMPHOCYTES NFR BLD: 39.5 % (ref 18–48)
MCH RBC QN AUTO: 31.1 PG (ref 27–31)
MCHC RBC AUTO-ENTMCNC: 31.9 G/DL (ref 32–36)
MCV RBC AUTO: 98 FL (ref 82–98)
MONOCYTES # BLD AUTO: 0.4 K/UL (ref 0.3–1)
MONOCYTES NFR BLD: 8.3 % (ref 4–15)
NEUTROPHILS # BLD AUTO: 2.6 K/UL (ref 1.8–7.7)
NEUTROPHILS NFR BLD: 49.5 % (ref 38–73)
NRBC BLD-RTO: 0 /100 WBC
PLATELET # BLD AUTO: 293 K/UL (ref 150–450)
PMV BLD AUTO: 9 FL (ref 9.2–12.9)
POTASSIUM SERPL-SCNC: 3.1 MMOL/L (ref 3.5–5.1)
PROT SERPL-MCNC: 7.8 G/DL (ref 6–8.4)
RBC # BLD AUTO: 4.05 M/UL (ref 4.6–6.2)
SODIUM SERPL-SCNC: 143 MMOL/L (ref 136–145)
WBC # BLD AUTO: 5.29 K/UL (ref 3.9–12.7)

## 2023-01-06 PROCEDURE — 99999 PR PBB SHADOW E&M-EST. PATIENT-LVL V: CPT | Mod: PBBFAC,,, | Performed by: COLON & RECTAL SURGERY

## 2023-01-06 PROCEDURE — 80053 COMPREHEN METABOLIC PANEL: CPT | Performed by: COLON & RECTAL SURGERY

## 2023-01-06 PROCEDURE — 99214 OFFICE O/P EST MOD 30 MIN: CPT | Mod: S$GLB,,, | Performed by: COLON & RECTAL SURGERY

## 2023-01-06 PROCEDURE — 99214 PR OFFICE/OUTPT VISIT, EST, LEVL IV, 30-39 MIN: ICD-10-PCS | Mod: S$GLB,,, | Performed by: COLON & RECTAL SURGERY

## 2023-01-06 PROCEDURE — 85025 COMPLETE CBC W/AUTO DIFF WBC: CPT | Performed by: COLON & RECTAL SURGERY

## 2023-01-06 PROCEDURE — 99999 PR PBB SHADOW E&M-EST. PATIENT-LVL V: ICD-10-PCS | Mod: PBBFAC,,, | Performed by: COLON & RECTAL SURGERY

## 2023-01-06 PROCEDURE — 36415 COLL VENOUS BLD VENIPUNCTURE: CPT | Performed by: COLON & RECTAL SURGERY

## 2023-01-06 PROCEDURE — 99215 OFFICE O/P EST HI 40 MIN: CPT | Mod: PBBFAC | Performed by: COLON & RECTAL SURGERY

## 2023-01-06 RX ORDER — LOPERAMIDE HYDROCHLORIDE 2 MG/1
2 CAPSULE ORAL 4 TIMES DAILY
Qty: 120 CAPSULE | Refills: 5 | Status: SHIPPED | OUTPATIENT
Start: 2023-01-06 | End: 2023-02-05

## 2023-01-06 RX ORDER — METRONIDAZOLE 500 MG/1
500 TABLET ORAL 2 TIMES DAILY
Qty: 2 TABLET | Refills: 0 | Status: SHIPPED | OUTPATIENT
Start: 2023-01-06 | End: 2023-01-07

## 2023-01-06 RX ORDER — POLYETHYLENE GLYCOL 3350 17 G/17G
POWDER, FOR SOLUTION ORAL
Qty: 289 G | Refills: 0 | Status: SHIPPED | OUTPATIENT
Start: 2023-01-06

## 2023-01-06 RX ORDER — CIPROFLOXACIN 500 MG/1
500 TABLET ORAL 2 TIMES DAILY
Qty: 2 TABLET | Refills: 0 | Status: SHIPPED | OUTPATIENT
Start: 2023-01-06 | End: 2023-01-07

## 2023-02-03 ENCOUNTER — TELEPHONE (OUTPATIENT)
Dept: SURGERY | Facility: CLINIC | Age: 68
End: 2023-02-03
Payer: OTHER GOVERNMENT

## 2023-02-03 RX ORDER — METRONIDAZOLE 500 MG/1
500 TABLET ORAL 2 TIMES DAILY
Qty: 2 TABLET | Refills: 0 | Status: SHIPPED | OUTPATIENT
Start: 2023-02-03 | End: 2023-02-04

## 2023-02-03 RX ORDER — CIPROFLOXACIN 500 MG/1
500 TABLET ORAL 2 TIMES DAILY
Qty: 2 TABLET | Refills: 0 | Status: SHIPPED | OUTPATIENT
Start: 2023-02-03 | End: 2023-02-04

## 2023-02-03 NOTE — TELEPHONE ENCOUNTER
Spoke with daughter and confirmed surgery arrival time for father.    Left voicemail with callback number on patient's cell phone.

## 2023-02-03 NOTE — TELEPHONE ENCOUNTER
----- Message from Vangie Purcell sent at 2/3/2023  1:11 PM CST -----  Contact: pt @ [phone]  ALEKSEY GOMEZ calling regarding Patient Advice (message) for #returing call from Scarlett, asking for call back

## 2023-02-06 ENCOUNTER — ANESTHESIA EVENT (OUTPATIENT)
Dept: SURGERY | Facility: HOSPITAL | Age: 68
DRG: 330 | End: 2023-02-06
Payer: OTHER GOVERNMENT

## 2023-02-06 ENCOUNTER — HOSPITAL ENCOUNTER (INPATIENT)
Facility: HOSPITAL | Age: 68
LOS: 3 days | Discharge: HOME HEALTH | DRG: 330 | End: 2023-02-09
Attending: COLON & RECTAL SURGERY | Admitting: COLON & RECTAL SURGERY
Payer: OTHER GOVERNMENT

## 2023-02-06 ENCOUNTER — ANESTHESIA (OUTPATIENT)
Dept: SURGERY | Facility: HOSPITAL | Age: 68
DRG: 330 | End: 2023-02-06
Payer: OTHER GOVERNMENT

## 2023-02-06 DIAGNOSIS — Z85.048 HISTORY OF RECTAL CANCER: ICD-10-CM

## 2023-02-06 DIAGNOSIS — Z93.3 COLOSTOMY IN PLACE: Primary | ICD-10-CM

## 2023-02-06 PROBLEM — K94.03 COLOSTOMY DYSFUNCTION: Status: ACTIVE | Noted: 2023-02-06

## 2023-02-06 LAB
ABO + RH BLD: NORMAL
BASOPHILS # BLD AUTO: 0.01 K/UL (ref 0–0.2)
BASOPHILS NFR BLD: 0.1 % (ref 0–1.9)
BLD GP AB SCN CELLS X3 SERPL QL: NORMAL
CREAT SERPL-MCNC: 0.9 MG/DL (ref 0.5–1.4)
DIFFERENTIAL METHOD: ABNORMAL
EOSINOPHIL # BLD AUTO: 0 K/UL (ref 0–0.5)
EOSINOPHIL NFR BLD: 0 % (ref 0–8)
ERYTHROCYTE [DISTWIDTH] IN BLOOD BY AUTOMATED COUNT: 14.2 % (ref 11.5–14.5)
EST. GFR  (NO RACE VARIABLE): >60 ML/MIN/1.73 M^2
HCT VFR BLD AUTO: 33.9 % (ref 40–54)
HGB BLD-MCNC: 11.1 G/DL (ref 14–18)
IMM GRANULOCYTES # BLD AUTO: 0.01 K/UL (ref 0–0.04)
IMM GRANULOCYTES NFR BLD AUTO: 0.1 % (ref 0–0.5)
LYMPHOCYTES # BLD AUTO: 0.4 K/UL (ref 1–4.8)
LYMPHOCYTES NFR BLD: 5.6 % (ref 18–48)
MCH RBC QN AUTO: 32.7 PG (ref 27–31)
MCHC RBC AUTO-ENTMCNC: 32.7 G/DL (ref 32–36)
MCV RBC AUTO: 100 FL (ref 82–98)
MONOCYTES # BLD AUTO: 0.5 K/UL (ref 0.3–1)
MONOCYTES NFR BLD: 6.4 % (ref 4–15)
NEUTROPHILS # BLD AUTO: 6.7 K/UL (ref 1.8–7.7)
NEUTROPHILS NFR BLD: 87.8 % (ref 38–73)
NRBC BLD-RTO: 0 /100 WBC
PLATELET # BLD AUTO: 200 K/UL (ref 150–450)
PMV BLD AUTO: 9 FL (ref 9.2–12.9)
RBC # BLD AUTO: 3.39 M/UL (ref 4.6–6.2)
WBC # BLD AUTO: 7.63 K/UL (ref 3.9–12.7)

## 2023-02-06 PROCEDURE — 36415 COLL VENOUS BLD VENIPUNCTURE: CPT | Performed by: COLON & RECTAL SURGERY

## 2023-02-06 PROCEDURE — 36000709 HC OR TIME LEV III EA ADD 15 MIN: Performed by: COLON & RECTAL SURGERY

## 2023-02-06 PROCEDURE — 44345 REVISION OF COLOSTOMY: CPT | Mod: 22,,, | Performed by: COLON & RECTAL SURGERY

## 2023-02-06 PROCEDURE — 36000708 HC OR TIME LEV III 1ST 15 MIN: Performed by: COLON & RECTAL SURGERY

## 2023-02-06 PROCEDURE — 88304 TISSUE EXAM BY PATHOLOGIST: CPT | Performed by: PATHOLOGY

## 2023-02-06 PROCEDURE — D9220A PRA ANESTHESIA: Mod: ANES,,, | Performed by: ANESTHESIOLOGY

## 2023-02-06 PROCEDURE — C1765 ADHESION BARRIER: HCPCS | Performed by: COLON & RECTAL SURGERY

## 2023-02-06 PROCEDURE — 25000003 PHARM REV CODE 250: Performed by: NURSE ANESTHETIST, CERTIFIED REGISTERED

## 2023-02-06 PROCEDURE — 94799 UNLISTED PULMONARY SVC/PX: CPT

## 2023-02-06 PROCEDURE — 37000009 HC ANESTHESIA EA ADD 15 MINS: Performed by: COLON & RECTAL SURGERY

## 2023-02-06 PROCEDURE — 63600175 PHARM REV CODE 636 W HCPCS: Performed by: NURSE PRACTITIONER

## 2023-02-06 PROCEDURE — 63600175 PHARM REV CODE 636 W HCPCS: Mod: TB,JG | Performed by: STUDENT IN AN ORGANIZED HEALTH CARE EDUCATION/TRAINING PROGRAM

## 2023-02-06 PROCEDURE — 20600001 HC STEP DOWN PRIVATE ROOM

## 2023-02-06 PROCEDURE — 71000015 HC POSTOP RECOV 1ST HR: Performed by: COLON & RECTAL SURGERY

## 2023-02-06 PROCEDURE — 94761 N-INVAS EAR/PLS OXIMETRY MLT: CPT

## 2023-02-06 PROCEDURE — 88304 PR  SURG PATH,LEVEL III: ICD-10-PCS | Mod: 26,,, | Performed by: PATHOLOGY

## 2023-02-06 PROCEDURE — 88304 TISSUE EXAM BY PATHOLOGIST: CPT | Mod: 26,,, | Performed by: PATHOLOGY

## 2023-02-06 PROCEDURE — 27201423 OPTIME MED/SURG SUP & DEVICES STERILE SUPPLY: Performed by: COLON & RECTAL SURGERY

## 2023-02-06 PROCEDURE — 63600175 PHARM REV CODE 636 W HCPCS: Performed by: NURSE ANESTHETIST, CERTIFIED REGISTERED

## 2023-02-06 PROCEDURE — 63600175 PHARM REV CODE 636 W HCPCS: Performed by: STUDENT IN AN ORGANIZED HEALTH CARE EDUCATION/TRAINING PROGRAM

## 2023-02-06 PROCEDURE — S0030 INJECTION, METRONIDAZOLE: HCPCS | Performed by: NURSE PRACTITIONER

## 2023-02-06 PROCEDURE — 25000003 PHARM REV CODE 250: Performed by: STUDENT IN AN ORGANIZED HEALTH CARE EDUCATION/TRAINING PROGRAM

## 2023-02-06 PROCEDURE — 99900031 HC PATIENT EDUCATION (STAT)

## 2023-02-06 PROCEDURE — 82565 ASSAY OF CREATININE: CPT | Performed by: COLON & RECTAL SURGERY

## 2023-02-06 PROCEDURE — 71000016 HC POSTOP RECOV ADDL HR: Performed by: COLON & RECTAL SURGERY

## 2023-02-06 PROCEDURE — 37000008 HC ANESTHESIA 1ST 15 MINUTES: Performed by: COLON & RECTAL SURGERY

## 2023-02-06 PROCEDURE — D9220A PRA ANESTHESIA: ICD-10-PCS | Mod: CRNA,,, | Performed by: NURSE ANESTHETIST, CERTIFIED REGISTERED

## 2023-02-06 PROCEDURE — 63600175 PHARM REV CODE 636 W HCPCS

## 2023-02-06 PROCEDURE — 25000003 PHARM REV CODE 250: Performed by: NURSE PRACTITIONER

## 2023-02-06 PROCEDURE — D9220A PRA ANESTHESIA: ICD-10-PCS | Mod: ANES,,, | Performed by: ANESTHESIOLOGY

## 2023-02-06 PROCEDURE — 85025 COMPLETE CBC W/AUTO DIFF WBC: CPT | Performed by: STUDENT IN AN ORGANIZED HEALTH CARE EDUCATION/TRAINING PROGRAM

## 2023-02-06 PROCEDURE — 71000033 HC RECOVERY, INTIAL HOUR: Performed by: COLON & RECTAL SURGERY

## 2023-02-06 PROCEDURE — D9220A PRA ANESTHESIA: Mod: CRNA,,, | Performed by: NURSE ANESTHETIST, CERTIFIED REGISTERED

## 2023-02-06 PROCEDURE — 44345 PR REVISION OF COLOSTOMY,COMPLICATED: ICD-10-PCS | Mod: 22,,, | Performed by: COLON & RECTAL SURGERY

## 2023-02-06 PROCEDURE — 86900 BLOOD TYPING SEROLOGIC ABO: CPT | Performed by: NURSE PRACTITIONER

## 2023-02-06 PROCEDURE — 63600175 PHARM REV CODE 636 W HCPCS: Performed by: ANESTHESIOLOGY

## 2023-02-06 DEVICE — MEMBRANE SEPRAFILM 5 X 6: Type: IMPLANTABLE DEVICE | Site: ABDOMEN | Status: FUNCTIONAL

## 2023-02-06 RX ORDER — ACETAMINOPHEN 325 MG/1
650 TABLET ORAL EVERY 8 HOURS
Status: DISCONTINUED | OUTPATIENT
Start: 2023-02-07 | End: 2023-02-09 | Stop reason: HOSPADM

## 2023-02-06 RX ORDER — FENTANYL CITRATE 50 UG/ML
INJECTION, SOLUTION INTRAMUSCULAR; INTRAVENOUS
Status: COMPLETED
Start: 2023-02-06 | End: 2023-02-06

## 2023-02-06 RX ORDER — MUPIROCIN 20 MG/G
OINTMENT TOPICAL 2 TIMES DAILY
Status: DISCONTINUED | OUTPATIENT
Start: 2023-02-06 | End: 2023-02-09 | Stop reason: HOSPADM

## 2023-02-06 RX ORDER — ROCURONIUM BROMIDE 10 MG/ML
INJECTION, SOLUTION INTRAVENOUS
Status: DISCONTINUED | OUTPATIENT
Start: 2023-02-06 | End: 2023-02-06

## 2023-02-06 RX ORDER — SODIUM CHLORIDE 9 MG/ML
INJECTION, SOLUTION INTRAVENOUS CONTINUOUS
Status: DISCONTINUED | OUTPATIENT
Start: 2023-02-06 | End: 2023-02-06

## 2023-02-06 RX ORDER — TRIPROLIDINE/PSEUDOEPHEDRINE 2.5MG-60MG
600 TABLET ORAL
Status: COMPLETED | OUTPATIENT
Start: 2023-02-06 | End: 2023-02-06

## 2023-02-06 RX ORDER — SODIUM CHLORIDE 9 MG/ML
INJECTION, SOLUTION INTRAVENOUS CONTINUOUS
Status: DISCONTINUED | OUTPATIENT
Start: 2023-02-06 | End: 2023-02-07

## 2023-02-06 RX ORDER — ONDANSETRON 2 MG/ML
4 INJECTION INTRAMUSCULAR; INTRAVENOUS EVERY 6 HOURS PRN
Status: DISCONTINUED | OUTPATIENT
Start: 2023-02-06 | End: 2023-02-09 | Stop reason: HOSPADM

## 2023-02-06 RX ORDER — SODIUM CHLORIDE 0.9 % (FLUSH) 0.9 %
10 SYRINGE (ML) INJECTION
Status: DISCONTINUED | OUTPATIENT
Start: 2023-02-06 | End: 2023-02-09 | Stop reason: HOSPADM

## 2023-02-06 RX ORDER — ALVIMOPAN 12 MG/1
12 CAPSULE ORAL 2 TIMES DAILY
Status: DISCONTINUED | OUTPATIENT
Start: 2023-02-06 | End: 2023-02-09 | Stop reason: HOSPADM

## 2023-02-06 RX ORDER — LIDOCAINE HYDROCHLORIDE 20 MG/ML
INJECTION, SOLUTION EPIDURAL; INFILTRATION; INTRACAUDAL; PERINEURAL
Status: DISCONTINUED | OUTPATIENT
Start: 2023-02-06 | End: 2023-02-06

## 2023-02-06 RX ORDER — ATORVASTATIN CALCIUM 20 MG/1
20 TABLET, FILM COATED ORAL NIGHTLY
Status: DISCONTINUED | OUTPATIENT
Start: 2023-02-06 | End: 2023-02-09 | Stop reason: HOSPADM

## 2023-02-06 RX ORDER — PROCHLORPERAZINE EDISYLATE 5 MG/ML
5 INJECTION INTRAMUSCULAR; INTRAVENOUS EVERY 6 HOURS PRN
Status: DISCONTINUED | OUTPATIENT
Start: 2023-02-06 | End: 2023-02-09 | Stop reason: HOSPADM

## 2023-02-06 RX ORDER — OXYCODONE HYDROCHLORIDE 10 MG/1
10 TABLET ORAL EVERY 4 HOURS PRN
Status: DISCONTINUED | OUTPATIENT
Start: 2023-02-06 | End: 2023-02-09 | Stop reason: HOSPADM

## 2023-02-06 RX ORDER — LIDOCAINE HYDROCHLORIDE 10 MG/ML
1 INJECTION, SOLUTION EPIDURAL; INFILTRATION; INTRACAUDAL; PERINEURAL
Status: COMPLETED | OUTPATIENT
Start: 2023-02-06 | End: 2023-02-06

## 2023-02-06 RX ORDER — FENTANYL CITRATE 50 UG/ML
25 INJECTION, SOLUTION INTRAMUSCULAR; INTRAVENOUS EVERY 5 MIN PRN
Status: COMPLETED | OUTPATIENT
Start: 2023-02-06 | End: 2023-02-06

## 2023-02-06 RX ORDER — IBUPROFEN 400 MG/1
800 TABLET ORAL EVERY 8 HOURS
Status: DISCONTINUED | OUTPATIENT
Start: 2023-02-07 | End: 2023-02-09 | Stop reason: HOSPADM

## 2023-02-06 RX ORDER — MUPIROCIN 20 MG/G
1 OINTMENT TOPICAL
Status: COMPLETED | OUTPATIENT
Start: 2023-02-06 | End: 2023-02-06

## 2023-02-06 RX ORDER — TRAMADOL HYDROCHLORIDE 50 MG/1
50 TABLET ORAL EVERY 6 HOURS PRN
Status: DISCONTINUED | OUTPATIENT
Start: 2023-02-06 | End: 2023-02-09 | Stop reason: HOSPADM

## 2023-02-06 RX ORDER — OXYCODONE HYDROCHLORIDE 5 MG/1
5 TABLET ORAL EVERY 4 HOURS PRN
Status: DISCONTINUED | OUTPATIENT
Start: 2023-02-06 | End: 2023-02-09 | Stop reason: HOSPADM

## 2023-02-06 RX ORDER — HYDROMORPHONE HYDROCHLORIDE 1 MG/ML
0.2 INJECTION, SOLUTION INTRAMUSCULAR; INTRAVENOUS; SUBCUTANEOUS EVERY 5 MIN PRN
Status: DISCONTINUED | OUTPATIENT
Start: 2023-02-06 | End: 2023-02-07

## 2023-02-06 RX ORDER — HEPARIN SODIUM 5000 [USP'U]/ML
5000 INJECTION, SOLUTION INTRAVENOUS; SUBCUTANEOUS EVERY 8 HOURS
Status: COMPLETED | OUTPATIENT
Start: 2023-02-06 | End: 2023-02-06

## 2023-02-06 RX ORDER — FENTANYL CITRATE 50 UG/ML
INJECTION, SOLUTION INTRAMUSCULAR; INTRAVENOUS
Status: DISCONTINUED | OUTPATIENT
Start: 2023-02-06 | End: 2023-02-06

## 2023-02-06 RX ORDER — HYDRALAZINE HYDROCHLORIDE 20 MG/ML
INJECTION INTRAMUSCULAR; INTRAVENOUS
Status: COMPLETED
Start: 2023-02-06 | End: 2023-02-06

## 2023-02-06 RX ORDER — ONDANSETRON 2 MG/ML
INJECTION INTRAMUSCULAR; INTRAVENOUS
Status: DISCONTINUED | OUTPATIENT
Start: 2023-02-06 | End: 2023-02-06

## 2023-02-06 RX ORDER — HYDROMORPHONE HYDROCHLORIDE 1 MG/ML
INJECTION, SOLUTION INTRAMUSCULAR; INTRAVENOUS; SUBCUTANEOUS
Status: COMPLETED
Start: 2023-02-06 | End: 2023-02-06

## 2023-02-06 RX ORDER — ACETAMINOPHEN 650 MG/20.3ML
975 LIQUID ORAL
Status: COMPLETED | OUTPATIENT
Start: 2023-02-06 | End: 2023-02-06

## 2023-02-06 RX ORDER — GABAPENTIN 300 MG/1
300 CAPSULE ORAL
Status: COMPLETED | OUTPATIENT
Start: 2023-02-06 | End: 2023-02-06

## 2023-02-06 RX ORDER — NAPROXEN SODIUM 220 MG/1
81 TABLET, FILM COATED ORAL DAILY
Status: DISCONTINUED | OUTPATIENT
Start: 2023-02-07 | End: 2023-02-09 | Stop reason: HOSPADM

## 2023-02-06 RX ORDER — KETAMINE HCL IN 0.9 % NACL 50 MG/5 ML
SYRINGE (ML) INTRAVENOUS
Status: DISCONTINUED | OUTPATIENT
Start: 2023-02-06 | End: 2023-02-06

## 2023-02-06 RX ORDER — SODIUM CHLORIDE 9 MG/ML
INJECTION, SOLUTION INTRAVENOUS
Status: COMPLETED | OUTPATIENT
Start: 2023-02-06 | End: 2023-02-06

## 2023-02-06 RX ORDER — PHENYLEPHRINE HCL IN 0.9% NACL 1 MG/10 ML
SYRINGE (ML) INTRAVENOUS
Status: DISCONTINUED | OUTPATIENT
Start: 2023-02-06 | End: 2023-02-06

## 2023-02-06 RX ORDER — HYDRALAZINE HYDROCHLORIDE 20 MG/ML
5 INJECTION INTRAMUSCULAR; INTRAVENOUS ONCE
Status: COMPLETED | OUTPATIENT
Start: 2023-02-06 | End: 2023-02-06

## 2023-02-06 RX ORDER — LABETALOL HCL 20 MG/4 ML
10 SYRINGE (ML) INTRAVENOUS EVERY 6 HOURS PRN
Status: DISCONTINUED | OUTPATIENT
Start: 2023-02-06 | End: 2023-02-09 | Stop reason: HOSPADM

## 2023-02-06 RX ORDER — PROPOFOL 10 MG/ML
VIAL (ML) INTRAVENOUS
Status: DISCONTINUED | OUTPATIENT
Start: 2023-02-06 | End: 2023-02-06

## 2023-02-06 RX ORDER — LIDOCAINE HYDROCHLORIDE ANHYDROUS AND DEXTROSE MONOHYDRATE .8; 5 G/100ML; G/100ML
INJECTION, SOLUTION INTRAVENOUS CONTINUOUS PRN
Status: DISCONTINUED | OUTPATIENT
Start: 2023-02-06 | End: 2023-02-06

## 2023-02-06 RX ORDER — METRONIDAZOLE 500 MG/100ML
500 INJECTION, SOLUTION INTRAVENOUS
Status: COMPLETED | OUTPATIENT
Start: 2023-02-06 | End: 2023-02-06

## 2023-02-06 RX ORDER — DEXAMETHASONE SODIUM PHOSPHATE 4 MG/ML
INJECTION, SOLUTION INTRA-ARTICULAR; INTRALESIONAL; INTRAMUSCULAR; INTRAVENOUS; SOFT TISSUE
Status: DISCONTINUED | OUTPATIENT
Start: 2023-02-06 | End: 2023-02-06

## 2023-02-06 RX ORDER — ENOXAPARIN SODIUM 100 MG/ML
30 INJECTION SUBCUTANEOUS EVERY 24 HOURS
Status: DISCONTINUED | OUTPATIENT
Start: 2023-02-06 | End: 2023-02-09 | Stop reason: HOSPADM

## 2023-02-06 RX ORDER — HALOPERIDOL 5 MG/ML
INJECTION INTRAMUSCULAR
Status: DISCONTINUED | OUTPATIENT
Start: 2023-02-06 | End: 2023-02-06

## 2023-02-06 RX ORDER — GABAPENTIN 300 MG/1
300 CAPSULE ORAL 3 TIMES DAILY
Status: DISPENSED | OUTPATIENT
Start: 2023-02-06

## 2023-02-06 RX ADMIN — LIDOCAINE HYDROCHLORIDE 60 MG: 20 INJECTION, SOLUTION EPIDURAL; INFILTRATION; INTRACAUDAL; PERINEURAL at 07:02

## 2023-02-06 RX ADMIN — METRONIDAZOLE 500 MG: 500 INJECTION, SOLUTION INTRAVENOUS at 07:02

## 2023-02-06 RX ADMIN — ROCURONIUM BROMIDE 40 MG: 10 INJECTION INTRAVENOUS at 07:02

## 2023-02-06 RX ADMIN — ACETAMINOPHEN 735 MG: 10 INJECTION INTRAVENOUS at 11:02

## 2023-02-06 RX ADMIN — ALVIMOPAN 12 MG: 12 CAPSULE ORAL at 11:02

## 2023-02-06 RX ADMIN — ONDANSETRON 4 MG: 2 INJECTION INTRAMUSCULAR; INTRAVENOUS at 09:02

## 2023-02-06 RX ADMIN — HYDROMORPHONE HYDROCHLORIDE 0.2 MG: 1 INJECTION, SOLUTION INTRAMUSCULAR; INTRAVENOUS; SUBCUTANEOUS at 11:02

## 2023-02-06 RX ADMIN — HYDROMORPHONE HYDROCHLORIDE 0.2 MG: 1 INJECTION, SOLUTION INTRAMUSCULAR; INTRAVENOUS; SUBCUTANEOUS at 12:02

## 2023-02-06 RX ADMIN — GABAPENTIN 300 MG: 300 CAPSULE ORAL at 06:02

## 2023-02-06 RX ADMIN — HYDRALAZINE HYDROCHLORIDE 5 MG: 20 INJECTION INTRAMUSCULAR; INTRAVENOUS at 10:02

## 2023-02-06 RX ADMIN — Medication 200 MCG: at 06:02

## 2023-02-06 RX ADMIN — ROCURONIUM BROMIDE 10 MG: 10 INJECTION INTRAVENOUS at 08:02

## 2023-02-06 RX ADMIN — FENTANYL CITRATE 25 MCG: 50 INJECTION, SOLUTION INTRAMUSCULAR; INTRAVENOUS at 11:02

## 2023-02-06 RX ADMIN — OXYCODONE HYDROCHLORIDE 10 MG: 10 TABLET ORAL at 06:02

## 2023-02-06 RX ADMIN — GABAPENTIN 300 MG: 300 CAPSULE ORAL at 12:02

## 2023-02-06 RX ADMIN — FENTANYL CITRATE 25 MCG: 50 INJECTION, SOLUTION INTRAMUSCULAR; INTRAVENOUS at 10:02

## 2023-02-06 RX ADMIN — MUPIROCIN: 20 OINTMENT TOPICAL at 09:02

## 2023-02-06 RX ADMIN — SODIUM CHLORIDE, SODIUM GLUCONATE, SODIUM ACETATE, POTASSIUM CHLORIDE, MAGNESIUM CHLORIDE, SODIUM PHOSPHATE, DIBASIC, AND POTASSIUM PHOSPHATE: .53; .5; .37; .037; .03; .012; .00082 INJECTION, SOLUTION INTRAVENOUS at 07:02

## 2023-02-06 RX ADMIN — ENOXAPARIN SODIUM 30 MG: 30 INJECTION SUBCUTANEOUS at 04:02

## 2023-02-06 RX ADMIN — OXYCODONE HYDROCHLORIDE 10 MG: 10 TABLET ORAL at 02:02

## 2023-02-06 RX ADMIN — SODIUM CHLORIDE: 9 INJECTION, SOLUTION INTRAVENOUS at 08:02

## 2023-02-06 RX ADMIN — OXYCODONE HYDROCHLORIDE 10 MG: 10 TABLET ORAL at 10:02

## 2023-02-06 RX ADMIN — Medication 25 MG: at 07:02

## 2023-02-06 RX ADMIN — FENTANYL CITRATE 50 MCG: 50 INJECTION INTRAMUSCULAR; INTRAVENOUS at 07:02

## 2023-02-06 RX ADMIN — HALOPERIDOL LACTATE 1 MG: 5 INJECTION, SOLUTION INTRAMUSCULAR at 08:02

## 2023-02-06 RX ADMIN — IBUPROFEN 800 MG: 800 INJECTION INTRAVENOUS at 01:02

## 2023-02-06 RX ADMIN — Medication 100 MCG: at 06:02

## 2023-02-06 RX ADMIN — SODIUM CHLORIDE: 9 INJECTION, SOLUTION INTRAVENOUS at 06:02

## 2023-02-06 RX ADMIN — ATORVASTATIN CALCIUM 20 MG: 20 TABLET, FILM COATED ORAL at 09:02

## 2023-02-06 RX ADMIN — GABAPENTIN 300 MG: 300 CAPSULE ORAL at 09:02

## 2023-02-06 RX ADMIN — IBUPROFEN 600 MG: 200 SUSPENSION ORAL at 06:02

## 2023-02-06 RX ADMIN — LIDOCAINE HYDROCHLORIDE ANHYDROUS AND DEXTROSE MONOHYDRATE 0.03 MG/KG/MIN: .8; 5 INJECTION, SOLUTION INTRAVENOUS at 07:02

## 2023-02-06 RX ADMIN — SODIUM CHLORIDE: 9 INJECTION, SOLUTION INTRAVENOUS at 10:02

## 2023-02-06 RX ADMIN — Medication 200 MCG: at 07:02

## 2023-02-06 RX ADMIN — FENTANYL CITRATE 50 MCG: 50 INJECTION INTRAMUSCULAR; INTRAVENOUS at 08:02

## 2023-02-06 RX ADMIN — IBUPROFEN 800 MG: 800 INJECTION INTRAVENOUS at 09:02

## 2023-02-06 RX ADMIN — PROPOFOL 150 MG: 10 INJECTION, EMULSION INTRAVENOUS at 07:02

## 2023-02-06 RX ADMIN — SUGAMMADEX 200 MG: 100 INJECTION, SOLUTION INTRAVENOUS at 09:02

## 2023-02-06 RX ADMIN — ACETAMINOPHEN 735 MG: 10 INJECTION INTRAVENOUS at 02:02

## 2023-02-06 RX ADMIN — DEXAMETHASONE SODIUM PHOSPHATE 8 MG: 4 INJECTION INTRA-ARTICULAR; INTRALESIONAL; INTRAMUSCULAR; INTRAVENOUS; SOFT TISSUE at 07:02

## 2023-02-06 RX ADMIN — Medication 15 MG: at 09:02

## 2023-02-06 RX ADMIN — HEPARIN SODIUM 5000 UNITS: 5000 INJECTION INTRAVENOUS; SUBCUTANEOUS at 06:02

## 2023-02-06 RX ADMIN — LIDOCAINE HYDROCHLORIDE 2 MG: 10 INJECTION, SOLUTION EPIDURAL; INFILTRATION; INTRACAUDAL; PERINEURAL at 06:02

## 2023-02-06 RX ADMIN — Medication 10 MG: at 07:02

## 2023-02-06 RX ADMIN — ACETAMINOPHEN 976.6 MG: 650 SOLUTION ORAL at 06:02

## 2023-02-06 RX ADMIN — DEXTROSE MONOHYDRATE 2 G: 5 INJECTION INTRAVENOUS at 07:02

## 2023-02-06 RX ADMIN — MUPIROCIN 1 G: 20 OINTMENT TOPICAL at 06:02

## 2023-02-06 NOTE — ANESTHESIA PREPROCEDURE EVALUATION
02/06/2023  Fran Negrete is a 67 y.o., male.  Pre-operative evaluation for Procedure(s) (LRB):  REVISION, COLOSTOMY, LAPAROSCOPIC (N/A)    Fran Negrete is a 67 y.o. male   H/o rectal cancer and LAR and multiple other prior abdominal surgeries (13) now with stomal malfunction. PMH cachexia and protein calorie malnutrition.     Patient Active Problem List   Diagnosis    Back pain    Rectal cancer    History of rectal cancer       Past Surgical History:   Procedure Laterality Date    COLONOSCOPY N/A 10/15/2021    Procedure: COLONOSCOPY;  Surgeon: DENISE Patel MD;  Location: [facility] (4TH FLR);  Service: Endoscopy;  Laterality: N/A;  per colostomy. Covid test on 10/12 at [facility]   Fully vacc Covid-19 - pg  miralax prep / Pt instructed to bring ostomy supplies   will need rapid COVID test-arrival time 9:00  10/14/21 - Pt confirmed new arrival time/ prep ins. reveiwed, Pt verbalized understanding-     COLOSTOMY      REVISION, COLOSTOMY, LAPAROSCOPIC N/A 3/7/2022    Procedure: REVISION, COLOSTOMY, LAPAROSCOPIC;  Surgeon: DENISE Patel MD;  Location: [facility] OR 2ND FLR;  Service: Colon and Rectal;  Laterality: N/A;       Social History     Socioeconomic History    Marital status: Single   Tobacco Use    Smoking status: Some Days     Types: Cigarettes   Substance and Sexual Activity    Alcohol use: Not Currently       Current Facility-Administered Medications on File Prior to Visit   Medication Dose Route Frequency Provider Last Rate Last Admin    0.9%  NaCl infusion   Intravenous On Call Procedure Leatha Mesa NP        acetaminophen oral solution 976.601 mg  976.601 mg Oral On Call Procedure Leatha Mesa NP        cefTRIAXone (ROCEPHIN) 2 g in dextrose 5 % in water (D5W) 5 % 50 mL IVPB (MB+)  2 g Intravenous On Call Procedure Leatha Mesa NP        And    metronidazole IVPB 500  mg  500 mg Intravenous On Call Procedure Leatha Mesa NP        gabapentin capsule 300 mg  300 mg Oral On Call Procedure Leatha Mesa NP        heparin (porcine) injection 5,000 Units  5,000 Units Subcutaneous Q8H Leatha Mesa NP        ibuprofen 100 mg/5 mL suspension 600 mg  600 mg Oral On Call Procedure Leatha Mesa NP        LIDOcaine (PF) 10 mg/ml (1%) injection 10 mg  1 mL Intradermal On Call Procedure Leatha Mesa NP        mupirocin 2 % ointment 1 g  1 g Nasal On Call Procedure Leatha Mesa NP         Current Outpatient Medications on File Prior to Visit   Medication Sig Dispense Refill    aspirin 81 MG Chew CHEW ONE TABLET BY MOUTH ONCE DAILY TO PREVENT BLOOD CLOT      atorvastatin (LIPITOR) 40 MG tablet TAKE ONE-HALF TABLET BY MOUTH ONCE DAILY FOR CHOLESTEROL      HYDROcodone-acetaminophen (NORCO) 5-325 mg per tablet Take 1 tablet by mouth every 6 (six) hours as needed for Pain. (Patient not taking: Reported on 1/6/2023) 20 tablet 0    neomycin (MYCIFRADIN) 500 mg Tab Take 2 tablets at 1pm, 2pm and 11 pm. (Patient not taking: Reported on 1/6/2023) 6 tablet 0    omeprazole (PRILOSEC) 20 MG capsule Take 20 mg by mouth.      polyethylene glycol (GLYCOLAX) 17 gram/dose powder Use 1 capful of Miralax per 8 ounces of clear liquid for 8 doses the night before surgery. 289 g 0    tadalafiL (CIALIS) 5 MG tablet Take 1 tablet by mouth once daily.      traMADoL (ULTRAM) 50 mg tablet Take 2 tablets (100 mg total) by mouth every 6 (six) hours. (Patient not taking: Reported on 1/6/2023) 40 tablet 0    vitamin E 400 UNIT capsule TAKE ONE CAPSULE BY MOUTH ONCE DAILY AS A VITAMIN SUPPLEMENT         Review of patient's allergies indicates:  No Known Allergies      CBC: No results for input(s): WBC, RBC, HGB, HCT, PLT, MCV, MCH, MCHC in the last 72 hours.    CMP: No results for input(s): NA, K, CL, CO2, BUN, CREATININE, GLU, MG, PHOS, CALCIUM, ALBUMIN, PROT, ALKPHOS, ALT, AST, BILITOT in  the last 72 hours.    INR  No results for input(s): PT, INR, PROTIME, APTT in the last 72 hours.      Diagnostic Studies:    EKG:   No results found for this or any previous visit.    TTE:  No results found for this or any previous visit.  No results found for: EF   No results found for this or any previous visit.    DEENA:  No results found for this or any previous visit.    Stress Test:  No results found for this or any previous visit.       LHC:  No results found for this or any previous visit.       PFT:  No results found for: FEV1, FVC, HCR4RXC, TLC, DLCO     ALLERGIES:   Review of patient's allergies indicates:  No Known Allergies  LDA:      Lines/Drains/Airways     Peripheral Intravenous Line  Duration                Peripheral IV - Single Lumen 10/15/21 0950 20 G Right Upper Arm 478 days               Anesthesia Evaluation      Airway   Mallampati: II  TM distance: > 6 cm  Neck ROM: Normal ROM  Dental    (+) Intact    Pulmonary    Cardiovascular     Rate: Normal    Neuro/Psych      GI/Hepatic/Renal      Endo/Other    Abdominal                         Pre-op Assessment    I have reviewed the Patient Summary Reports.    I have reviewed the Nursing Notes. I have reviewed the NPO Status.   I have reviewed the Medications.     Review of Systems  Anesthesia Hx:  No problems with previous Anesthesia  Denies Family Hx of Anesthesia complications.   Denies Personal Hx of Anesthesia complications.   Social:  Smoker, No Alcohol Use    Hematology/Oncology:        Oncology Comments: Rectal cancer   Cardiovascular:  Cardiovascular Normal     Pulmonary:  Pulmonary Normal    Renal/:  Renal/ Normal     Hepatic/GI:   Rectal cancer  COLOSTOMY   Neurological:  Neurology Normal    Endocrine:  Endocrine Normal        Physical Exam  General: Cachexia    Airway:  Mallampati: II   Mouth Opening: Normal  TM Distance: > 6 cm  Tongue: Normal  Neck ROM: Normal ROM    Dental:  Intact    Chest/Lungs:  Normal Respiratory  Rate    Heart:  Rate: Normal        Anesthesia Plan  Type of Anesthesia, risks & benefits discussed:    Anesthesia Type: Gen ETT  Intra-op Monitoring Plan: Standard ASA Monitors  Post Op Pain Control Plan: multimodal analgesia and IV/PO Opioids PRN  Induction:  IV  Airway Plan: Direct, Post-Induction  Informed Consent: Informed consent signed with the Patient and all parties understand the risks and agree with anesthesia plan.  All questions answered. Patient consented to blood products? Yes  ASA Score: 3  Day of Surgery Review of History & Physical: H&P Update referred to the surgeon/provider.    Ready For Surgery From Anesthesia Perspective.     .

## 2023-02-06 NOTE — OP NOTE
FRAN NEGRETE  [id number]  [id number]    OPERATIVE NOTE:    DATE OF OPERATION: 02/06/2023    PREOPERATIVE DIAGNOSIS:  Colostomy stricture    POSTOPERATIVE DIAGNOSIS:  Colostomy stricture    PROCEDURE PERFORMED:  Exploratory laparotomy with lysis of adhesions and relocation of the proximal descending end colostomy to the right side.  Complete mobilization of the splenic flexure.    ATTENDING SURGEON: DENISE Patel MD    RESIDENT/FELLOW SURGEON: Nighat Lu MD    ANESTHESIA:  General    ESTIMATED BLOOD LOSS:  50 mL    FINDINGS:  1. Extensive adhesions.  Adhesions requiring prolonged sharp dissection that was performed with a 10 blade.  Transverse colon was identified.  Small bowel was freed off the transverse colon.  The proximal end colostomy was circumferentially dissected.  This was transected with a DYLAN 75 mm stapler with a blue load just below posterior rectus fascia.  The splenic flexure was completely mobilized in order to allow the colon to reach the contralateral side.    SPECIMENS:  Colostomy    COMPLICATIONS:  None apparent    DISPOSITION: PACU    CONDITION: good    INDICATION FOR PROCEDURE:  Fran Negrete is a 67 y.o. male with a history of rectal cancer underwent proctectomy multiple years ago.  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  This was treated with a completion proctectomy with descending colostomy.  Multiple prior abdominal surgeries.  He presented with colostomy stenosis and extensive granulation tissue around the ostomy impacting his ability to pouch and function.    DESCRIPTION OF PROCEDURE:   After informed consent was reviewed, the patient was taken to the operating room and transferred to the OR table.  he was placed under general anesthesia.  A lindo catheter was sterilely inserted.  Ceftriaxone and flagyl were given for preoperative antibiotics.    He was placed in the supine position with his arms out.  The anterior abdominal wall was prepped and draped in usual sterile fashion and a time-out  was performed.  His midline incision was then opened sharply.  The fascia was incised sharply in order to avoid injury to the underlying bowel which was densely adherent.  Once fascia was incised the peritoneum was entered, the edges of the fascia were grasped with Kocher clamps and extensive lysis of adhesions was then performed in order to get the small bowel off of the anterior abdominal wall.  Total lysis of adhesions was approximately 90 minutes.  The lysis of adhesions was done with a 10 blade as there was no surrounding adipose tissue around bowel in the adhesions to the fascia were very dense.  Once the anterior abdominal wall was clear of adhesions, we identified the stomach and transverse colon.  The lesser sac was entered.  There was minimal remaining omental tissue.  The transverse colon was elevated the small bowel was freed off of the transverse colon mesentery.  Dissection continued out towards colostomy in the left upper abdomen.  The colostomy was circumferentially dissected.  It was transected just below the level of the fascia with a DYLAN 75 mm stapler with a blue load.  The end of the proximal descending colon was grasped and the splenic flexure was completely mobilized in order to allow the distal transverse colon to reach the contralateral side.  The small bowel was inspected and there were no injuries to the small bowel seen.  The colostomy was then taken down.  This was performed with electrocautery.  Fascial edges of the anterior posterior rectus fascia were circumferentially dissected.  Posterior rectus fascia was closed with a 0 running PDS.  The anterior rectus fascia was closed with interrupted figure-of-eight 0 PDS sutures.  The colostomy was brought out through the skin through a aezb on right upper quadrant.  The anterior posterior rectus fascia were incised in longitudinal fashion.  Anterior and posterior rectus fascial openings were offset.  Colon was brought through under minimal  tension.  It appeared very well vascularized.  The fascia in the midline incision was then  from the overlying skin.  The fascia of the midline incision was closed with new instruments for clean closure tray.  The fascia was closed with a 0 looped PDS.  Skin was closed with a running 4-0 Vicryl in subcuticular fashion.  Steri-Strips were applied.  The ostomy was then matured in standard Maylin fashion with interrupted 3-0 Vicryl sutures.  The patient tolerated the procedure well.  There were no apparent complications.  All sponge, needle, and instruments counts were correct x 2.  he was then extubated and taken to PACU in stable condition.        DENISE Patel MD, FACS, FASCRS  Staff Surgeon  Colon & Rectal Surgery

## 2023-02-06 NOTE — H&P
CRS  History and Physical    SUBJECTIVE:     Chief Complaint: colostomy dysfunction    Interval Hx 2/6/23: No changes since clinic. Ready for OR today.    History of Present Illness:  Patient is a 67 y.o. male presents with colostomy dysfunction. The patient is a established patient to this practice.     Course is as follows:  [facility]  Initially did a LAR with ileostomy.  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  He then had a completion proctectomy with left colectomy and a proximal to descend colostomy brought out.   the majority of the surgery was performed in [facility].  He had 6 surgeries in [place] for rectal cancer and a total of 13 prior abdominal surgeries.  He presents today for evaluation for stomal dysfunction.  He has narrowing of the stoma opening as well as significant granulation tissue around the stoma that is tender to touch making pouching difficult.  He recalls that a local revision was attempted in the past.  On 10/15/2021, colonoscopy was performed with excision of granulation tissue around the stoma.  This resulted in a 3-4 month improvement.  Functionally, he is independent and performs all of his activities of daily living.  Due to pouching difficulties, he has to change the pouch daily.  He normally has liquid output through his ostomy.  Weight has been stable.  Rarely smokes tobacco.  Does regularly smoke marijuana.    3/7/22:  Plan for laparoscopic revision of the colostomy.  Patient never showed.    Current status:  1/6/23:  Lost 10 lb since his prior visit in February 2022.  Worsening granulation tissue around the colostomy site resulting in increased pain.  He is having high ostomy output.  Was previously taking Imodium but ran out and did not restart.  Frequently afraid to drink liquids an effort to avoid increased ostomy output.  Remains ambulatory at home.      Last Colonoscopy: 10/15/2021  Impression:           "  - Patent but strictured end colostomy with                          moderate stenosis and granuloma. Granuloma excised                          with hot snare in the mid transverse colon.                          - Non-patent side-to-side ileo-colonic                          anastomosis, characterized by healthy appearing                          mucosa.                          - Three 3 to 6 mm polyps in the transverse colon,                          removed with a cold snare. Resected and retrieved.     Review of Systems:  Review of Systems   Constitutional:  Negative for chills, diaphoresis, fever, malaise/fatigue and weight loss.   HENT:  Negative for congestion.    Respiratory:  Negative for shortness of breath.    Cardiovascular:  Negative for chest pain and leg swelling.   Gastrointestinal:  Positive for abdominal pain and diarrhea. Negative for blood in stool, constipation, nausea and vomiting.   Genitourinary:  Negative for dysuria.   Musculoskeletal:  Negative for back pain and myalgias.   Skin:  Positive for rash.   Neurological:  Negative for dizziness and weakness.   Endo/Heme/Allergies:  Does not bruise/bleed easily.   Psychiatric/Behavioral:  Negative for depression.      OBJECTIVE:     Vital Signs (Most Recent)  /72 (BP Location: Left arm, Patient Position: Sitting, BP Method: Small (Automatic))   Pulse (!) 142   Ht 5' 11" (1.803 m)   Wt 49.8 kg (109 lb 12.6 oz)   BMI 15.31 kg/m²     Physical Exam:  General: Black or  male in no distress   Neuro: alert and oriented x 4.  Moves all extremities.     HEENT: no icterus.  Trachea midline  Respiratory: respirations are even and unlabored  Cardiac: regular rate  Abdomen:  Large midline incision is well healed.  Prior ileostomy site in the right lower quadrant is well healed.  No hernia.  Stoma in the left upper abdomen with stenosis and dense granulation tissue around the stoma that is tender to palpation.      Extremities: " Warm dry and intact  Skin: no rashes  Anorectal:  Deferred    Labs: H&H 13 and 40.  Albumin remains stable at 3.6.  Creatinine 1.1.    Imaging: CT abd pelvis from 2/10/22 personally reviewed and shows proximal descending colostomy. Splenic flexure intact.        ASSESSMENT/PLAN:     Diagnoses and all orders for this visit:    Colostomy dysfunction  -     loperamide (IMODIUM) 2 mg capsule; Take 1 capsule (2 mg total) by mouth 4 (four) times daily.  -     Comprehensive Metabolic Panel; Future  -     CBC Auto Differential; Future  -     Case Request Operating Room: REVISION, COLOSTOMY, JAG rendon          67 y.o. male with dysfunction of a proximal end descending colostomy following extensive surgery for rectal cancer in 2013 in [place].  Based on the CT, local revision does not appear likely to be successful as the stoma is created from his splenic flexure.  Splenic flexure does not appear to be completely mobilized or well mobilized.      Repeat discussion today regarding colostomy revision.  Given the amount of granulation tissue and stenosis around the ostomy, he would likely benefit from midline laparotomy, lysis of adhesions, relocation of the colostomy to the right side of the abdomen, excision of the granulation tissue.  Discussed that this would take around 4 hours.  Discussed that he would be in the hospital for proximally 3-5 days afterwards with a 6 week total recovery.  Discussed the risk of incisional hernia, wound infection, pain high ostomy output.  In the meantime, recommended restarting Imodium.  Labs were checked today his albumin remains normal despite his weight loss.    Did recommend that he go to the ER for fluid resuscitation regarding his tachycardia but he deferred.      - Inpatient admission for colostomy revision  - consent in media tab  - all questions answered    Nighat Lu MD  General Surgery Resident, PGY V  Pager [phone]

## 2023-02-06 NOTE — TRANSFER OF CARE
"Anesthesia Transfer of Care Note    Patient: Fran Negrete    Procedure(s) Performed: Procedure(s) (LRB):  REVISION, COLOSTOMY, ERAS low (N/A)    Patient location: PACU    Anesthesia Type: general    Transport from OR: Transported from OR on 6-10 L/min O2 by face mask with adequate spontaneous ventilation    Post pain: adequate analgesia    Post assessment: no apparent anesthetic complications and tolerated procedure well    Post vital signs: stable    Level of consciousness: sedated    Nausea/Vomiting: no nausea/vomiting    Complications: none    Transfer of care protocol was followed      Last vitals:   Visit Vitals  BP (!) 175/98 (BP Location: Right arm, Patient Position: Lying)   Pulse 76   Temp 36.3 °C (97.4 °F) (Oral)   Resp 16   Ht 5' 11" (1.803 m)   Wt 49 kg (108 lb)   SpO2 100%   BMI 15.06 kg/m²     "

## 2023-02-06 NOTE — BRIEF OP NOTE
[facility] - Surgery ([facility])  Brief Operative Note    SUMMARY     Surgery Date: 2/6/2023     Surgeon(s) and Role:     * DENISE Weiner MD - Primary     * Nighat Lu MD - Resident - Assisting        Pre-op Diagnosis:  Colostomy dysfunction [K94.03]    Post-op Diagnosis:  Post-Op Diagnosis Codes:     * Colostomy dysfunction [K94.03]    Procedure(s) (LRB):  REVISION, COLOSTOMY, ERAS low (N/A)    Anesthesia: General    Operative Findings: dense intraabdominal adhesions, extensive SAMEER. Mobilization of splenic flexure and new colostomy site in the RUQ.     Estimated Blood Loss: 30cc    Estimated Blood Loss has not been documented. EBL = 30cc.         Specimens:   Specimen (24h ago, onward)       Start     Ordered    02/06/23 0854  Specimen to Pathology, Surgery General Surgery  Once        Comments: Pre-op Diagnosis: Colostomy dysfunction [K94.03]Procedure(s):REVISION, COLOSTOMY, ERAS low Number of specimens: 1Name of specimens: colostomy - perm     References:    Click here for ordering Quick Tip   Question Answer Comment   Procedure Type: General Surgery    Specimen Class: Routine/Screening    Which provider would you like to cc? DENISE WEINER    Release to patient Immediate        02/06/23 0853                    [id number]

## 2023-02-06 NOTE — ANESTHESIA PROCEDURE NOTES
Intubation    Date/Time: 2/6/2023 7:13 AM  Performed by: Airam Contreras CRNA  Authorized by: Jayy Meyer MD     Intubation:     Induction:  Intravenous    Intubated:  Postinduction    Mask Ventilation:  Easy with oral airway    Attempts:  1    Attempted By:  CRNA    Method of Intubation:  Video laryngoscopy    Blade:  Zamorano 3    Laryngeal View Grade: Grade I - full view of cords      Difficult Airway Encountered?: No      Complications:  None    Airway Device:  Oral endotracheal tube    Airway Device Size:  7.5    Style/Cuff Inflation:  Cuffed (inflated to minimal occlusive pressure)    Inflation Amount (mL):  8    Tube secured:  22    Secured at:  The lips    Placement Verified By:  Capnometry    Complicating Factors:  None    Findings Post-Intubation:  BS equal bilateral and atraumatic/condition of teeth unchanged

## 2023-02-07 LAB
ALBUMIN SERPL BCP-MCNC: 2.8 G/DL (ref 3.5–5.2)
ALP SERPL-CCNC: 42 U/L (ref 55–135)
ALT SERPL W/O P-5'-P-CCNC: 13 U/L (ref 10–44)
ANION GAP SERPL CALC-SCNC: 9 MMOL/L (ref 8–16)
AST SERPL-CCNC: 26 U/L (ref 10–40)
BASOPHILS # BLD AUTO: 0.02 K/UL (ref 0–0.2)
BASOPHILS NFR BLD: 0.3 % (ref 0–1.9)
BILIRUB SERPL-MCNC: 0.7 MG/DL (ref 0.1–1)
BUN SERPL-MCNC: 12 MG/DL (ref 8–23)
CALCIUM SERPL-MCNC: 8.3 MG/DL (ref 8.7–10.5)
CHLORIDE SERPL-SCNC: 104 MMOL/L (ref 95–110)
CO2 SERPL-SCNC: 20 MMOL/L (ref 23–29)
CREAT SERPL-MCNC: 0.9 MG/DL (ref 0.5–1.4)
DIFFERENTIAL METHOD: ABNORMAL
EOSINOPHIL # BLD AUTO: 0 K/UL (ref 0–0.5)
EOSINOPHIL NFR BLD: 0.2 % (ref 0–8)
ERYTHROCYTE [DISTWIDTH] IN BLOOD BY AUTOMATED COUNT: 14.2 % (ref 11.5–14.5)
EST. GFR  (NO RACE VARIABLE): >60 ML/MIN/1.73 M^2
GLUCOSE SERPL-MCNC: 136 MG/DL (ref 70–110)
HCT VFR BLD AUTO: 29.9 % (ref 40–54)
HGB BLD-MCNC: 10 G/DL (ref 14–18)
IMM GRANULOCYTES # BLD AUTO: 0.01 K/UL (ref 0–0.04)
IMM GRANULOCYTES NFR BLD AUTO: 0.2 % (ref 0–0.5)
LYMPHOCYTES # BLD AUTO: 0.9 K/UL (ref 1–4.8)
LYMPHOCYTES NFR BLD: 14.6 % (ref 18–48)
MCH RBC QN AUTO: 32.4 PG (ref 27–31)
MCHC RBC AUTO-ENTMCNC: 33.4 G/DL (ref 32–36)
MCV RBC AUTO: 97 FL (ref 82–98)
MONOCYTES # BLD AUTO: 0.2 K/UL (ref 0.3–1)
MONOCYTES NFR BLD: 3.8 % (ref 4–15)
NEUTROPHILS # BLD AUTO: 5.1 K/UL (ref 1.8–7.7)
NEUTROPHILS NFR BLD: 80.9 % (ref 38–73)
NRBC BLD-RTO: 0 /100 WBC
PLATELET # BLD AUTO: 175 K/UL (ref 150–450)
PLATELET BLD QL SMEAR: ABNORMAL
PMV BLD AUTO: 9.1 FL (ref 9.2–12.9)
POTASSIUM SERPL-SCNC: 2.9 MMOL/L (ref 3.5–5.1)
PROT SERPL-MCNC: 5.8 G/DL (ref 6–8.4)
RBC # BLD AUTO: 3.09 M/UL (ref 4.6–6.2)
SODIUM SERPL-SCNC: 133 MMOL/L (ref 136–145)
WBC # BLD AUTO: 6.31 K/UL (ref 3.9–12.7)

## 2023-02-07 PROCEDURE — 97165 OT EVAL LOW COMPLEX 30 MIN: CPT

## 2023-02-07 PROCEDURE — 63600175 PHARM REV CODE 636 W HCPCS: Performed by: STUDENT IN AN ORGANIZED HEALTH CARE EDUCATION/TRAINING PROGRAM

## 2023-02-07 PROCEDURE — 85025 COMPLETE CBC W/AUTO DIFF WBC: CPT | Performed by: COLON & RECTAL SURGERY

## 2023-02-07 PROCEDURE — 97116 GAIT TRAINING THERAPY: CPT

## 2023-02-07 PROCEDURE — 97530 THERAPEUTIC ACTIVITIES: CPT

## 2023-02-07 PROCEDURE — 80053 COMPREHEN METABOLIC PANEL: CPT | Performed by: COLON & RECTAL SURGERY

## 2023-02-07 PROCEDURE — 36415 COLL VENOUS BLD VENIPUNCTURE: CPT | Performed by: COLON & RECTAL SURGERY

## 2023-02-07 PROCEDURE — 25000003 PHARM REV CODE 250: Performed by: STUDENT IN AN ORGANIZED HEALTH CARE EDUCATION/TRAINING PROGRAM

## 2023-02-07 PROCEDURE — 20600001 HC STEP DOWN PRIVATE ROOM

## 2023-02-07 PROCEDURE — 97161 PT EVAL LOW COMPLEX 20 MIN: CPT

## 2023-02-07 RX ORDER — POTASSIUM CHLORIDE 20 MEQ/1
40 TABLET, EXTENDED RELEASE ORAL ONCE
Status: DISCONTINUED | OUTPATIENT
Start: 2023-02-07 | End: 2023-02-07

## 2023-02-07 RX ADMIN — MUPIROCIN: 20 OINTMENT TOPICAL at 09:02

## 2023-02-07 RX ADMIN — POTASSIUM BICARBONATE 40 MEQ: 391 TABLET, EFFERVESCENT ORAL at 10:02

## 2023-02-07 RX ADMIN — ENOXAPARIN SODIUM 30 MG: 30 INJECTION SUBCUTANEOUS at 06:02

## 2023-02-07 RX ADMIN — ASPIRIN 81 MG: 81 TABLET, CHEWABLE ORAL at 09:02

## 2023-02-07 RX ADMIN — ALVIMOPAN 12 MG: 12 CAPSULE ORAL at 09:02

## 2023-02-07 RX ADMIN — GABAPENTIN 300 MG: 300 CAPSULE ORAL at 09:02

## 2023-02-07 RX ADMIN — IBUPROFEN 800 MG: 400 TABLET ORAL at 09:02

## 2023-02-07 RX ADMIN — OXYCODONE HYDROCHLORIDE 10 MG: 10 TABLET ORAL at 06:02

## 2023-02-07 RX ADMIN — ACETAMINOPHEN 735 MG: 10 INJECTION INTRAVENOUS at 06:02

## 2023-02-07 RX ADMIN — POTASSIUM BICARBONATE 40 MEQ: 391 TABLET, EFFERVESCENT ORAL at 06:02

## 2023-02-07 RX ADMIN — ATORVASTATIN CALCIUM 20 MG: 20 TABLET, FILM COATED ORAL at 09:02

## 2023-02-07 RX ADMIN — ACETAMINOPHEN 650 MG: 325 TABLET ORAL at 09:02

## 2023-02-07 RX ADMIN — ACETAMINOPHEN 650 MG: 325 TABLET ORAL at 03:02

## 2023-02-07 RX ADMIN — GABAPENTIN 300 MG: 300 CAPSULE ORAL at 03:02

## 2023-02-07 RX ADMIN — IBUPROFEN 800 MG: 800 INJECTION INTRAVENOUS at 07:02

## 2023-02-07 RX ADMIN — IBUPROFEN 800 MG: 400 TABLET ORAL at 03:02

## 2023-02-07 NOTE — ANESTHESIA POSTPROCEDURE EVALUATION
Anesthesia Post Evaluation    Patient: Fran Negrete    Procedure(s) Performed: Procedure(s) (LRB):  REVISION, COLOSTOMY, ERAS low (N/A)    Final Anesthesia Type: general      Patient location during evaluation: PACU  Patient participation: Yes- Able to Participate  Level of consciousness: awake and alert  Post-procedure vital signs: reviewed and stable  Pain management: adequate  Airway patency: patent    PONV status at discharge: No PONV  Anesthetic complications: no      Cardiovascular status: blood pressure returned to baseline  Respiratory status: unassisted  Hydration status: euvolemic  Follow-up not needed.          Vitals Value Taken Time   /85 02/07/23 1135   Temp 36.4 °C (97.6 °F) 02/07/23 1135   Pulse 76 02/07/23 1135   Resp 20 02/07/23 1135   SpO2 95 % 02/07/23 1135         Event Time   Out of Recovery 10:30:00         Pain/Alfie Score: Pain Rating Prior to Med Admin: 8 (2/7/2023  3:15 PM)  Pain Rating Post Med Admin: 0 (2/7/2023  6:14 AM)  Alfie Score: 10 (2/6/2023  2:00 PM)

## 2023-02-07 NOTE — PT/OT/SLP EVAL
"Physical Therapy Co-Evaluation    Patient Name:  Fran Negrete   MRN:  [id number]    Recommendations:     Discharge Recommendations: home   Discharge Equipment Recommendations: none   Barriers to discharge: None    Assessment:     Fran Negrete is a 67 y.o. male admitted with a medical diagnosis of Colostomy dysfunction.  He presents with the following impairments/functional limitations: impaired endurance, gait instability, impaired balance, impaired functional mobility. Pt was receptive to physical therapy co-evaluation. Pt able to amb 200 feet with RW and supervision and amb 175 feet with SBA, without AD. Pt would benefit from acute skilled physical therapy to improve functional mobility and incorporate stair trials for safe return home with no needs..    Rehab Prognosis: Good; patient would benefit from acute skilled PT services to address these deficits and reach maximum level of function.    Recent Surgery: Procedure(s) (LRB):  REVISION, COLOSTOMY, ERAS low (N/A) 1 Day Post-Op    Plan:     During this hospitalization, patient to be seen 3 x/week to address the identified rehab impairments via gait training, therapeutic activities, therapeutic exercises and progress toward the following goals:    Plan of Care Expires:  03/09/23    Subjective     Chief Complaint: Mild abdominal pain  Patient/Family Comments/goals: "I want to try walking without the walker"  Pain/Comfort:  Pain Rating 1: other (see comments) (pt did not rate)  Location - Orientation 1: generalized  Location 1: abdomen  Pain Addressed 1: Distraction  Pain Rating Post-Intervention 1: 0/10    Patients cultural, spiritual, Sikh conflicts given the current situation: no    Living Environment:  Pt lives alone in apartment on the second story. Pt reports have to go up ~16 stairs, and apartment has no elevator.  Prior to admission, patients level of function was modified independent using cane for mobility on uneven surfaces. Pt reports having caregiver " come to his apartment 3x/week to help with dressing and bathing.  Equipment used at home: rollator, cane, straight. Upon discharge, patient will have assistance from caregiver.    Objective:     Communicated with nurse prior to session.  Patient found HOB elevated with peripheral IV, colostomy  upon PT entry to room.    General Precautions: Standard, fall  Orthopedic Precautions:N/A   Braces: N/A  Respiratory Status: Room air    Exams:  RLE ROM: WFL  RLE Strength: grossly 4/5  LLE ROM: WFL  LLE Strength: grossly 4/5    Functional Mobility:  Bed Mobility:     Rolling Left:  supervision  Supine to Sit: supervision  Sit to Supine: supervision  Transfers:     Sit to Stand:  supervision with no AD  Gait: 10' to bathroom with RW and supervision.   200' out in echols with RW and supervision.   Pt amb with fair mason  175' with SBA and no AD  Pt amb with mild unsteadiness and NBOS but no LOB  Balance: Static/dynamic sitting balance: good, pt able to sit EOB with supervision for muscle testing  Static standing balance: good, pt able to stand at toilet ~3 mins with supervision  Dynamic standing balance: fair to fair+, pt able to amb with AD but amb with mild unsteadiness but no LOB       AM-PAC 6 CLICK MOBILITY  Total Score:23       Treatment & Education:  Discussed POC with pt who verbalized understanding. Educated Pt on role of PT in rehabilitation. Educated pt on safety with mobility.    Patient left HOB elevated with all lines intact and call button in reach.    GOALS:   Multidisciplinary Problems       Physical Therapy Goals          Problem: Physical Therapy    Goal Priority Disciplines Outcome Goal Variances Interventions   Physical Therapy Goal     PT, PT/OT Ongoing, Progressing     Description: Goals to be met by: 2023     Patient will increase functional independence with mobility by performin. Gait  x 400 feet with Supervision using LRAD.   2. Ascend/descend 2 flights of stairs with right Handrails  Supervision using LRAD.                          History:     Past Medical History:   Diagnosis Date    Rectal cancer        Past Surgical History:   Procedure Laterality Date    COLONOSCOPY N/A 10/15/2021    Procedure: COLONOSCOPY;  Surgeon: DENISE Patel MD;  Location: [facility] (4TH FLR);  Service: Endoscopy;  Laterality: N/A;  per colostomy. Covid test on 10/12 at [facility]   Fully vacc Covid-19 - pg  miralax prep / Pt instructed to bring ostomy supplies   will need rapid COVID test-arrival time 9:00  10/14/21 - Pt confirmed new arrival time/ prep ins. reveiwed, Pt verbalized understanding-     COLOSTOMY      REVISION COLOSTOMY N/A 2/6/2023    Procedure: REVISION, COLOSTOMY, ERAS low;  Surgeon: DENISE Patel MD;  Location: [address];  Service: Colon and Rectal;  Laterality: N/A;    REVISION, COLOSTOMY, LAPAROSCOPIC N/A 3/7/2022    Procedure: REVISION, COLOSTOMY, LAPAROSCOPIC;  Surgeon: DENISE Patel MD;  Location: [address];  Service: Colon and Rectal;  Laterality: N/A;       Time Tracking:     PT Received On: 02/07/23  PT Start Time: 1045     PT Stop Time: 1109  PT Total Time (min): 24 min     Billable Minutes: Evaluation 10 and Gait Training 14      02/07/2023

## 2023-02-07 NOTE — PT/OT/SLP EVAL
Occupational Therapy   Evaluation    Name: Fran Negrete  MRN: [id number]  Admitting Diagnosis: Colostomy dysfunction  Recent Surgery: Procedure(s) (LRB):  REVISION, COLOSTOMY, ERAS low (N/A) 1 Day Post-Op    Recommendations:     Discharge Recommendations: home  Discharge Equipment Recommendations:  none  Barriers to discharge:  None    Assessment:     Fran Negrete is a 67 y.o. male with a medical diagnosis of Colostomy dysfunction.  He presents s/p ex lap with colostomy on 2/6. Performance deficits affecting function: impaired endurance, impaired self care skills, impaired functional mobility, gait instability, impaired balance.      Rehab Prognosis: Good; patient would benefit from acute skilled OT services to address these deficits and reach maximum level of function.       Plan:     Patient to be seen 3 x/week to address the above listed problems via self-care/home management, therapeutic exercises, therapeutic activities  Plan of Care Expires: 03/09/23  Plan of Care Reviewed with: patient    Subjective     Occupational Profile:  Living Environment: Pt lives alone in a 2nd floor apt with 16 steps to enter.  Previous level of function: (I) with ADLs / uses a SPC / has a caregiver who assists 3x/week.  Equipment Used at Home: rollator, cane, straight  Assistance upon Discharge: TBD    Pain/Comfort:  Pain Rating 1: 0/10    Patients cultural, spiritual, Tenriism conflicts given the current situation:      Objective:     Communicated with: rn prior to session.  Patient found supine with peripheral IV upon OT entry to room.    General Precautions: Standard,    Orthopedic Precautions:    Braces:    Respiratory Status: Room air    Occupational Performance:    Bed Mobility:    Patient completed Rolling/Turning to Left with  supervision  Patient completed Supine to Sit with supervision  Patient completed Sit to Supine with supervision    Functional Mobility/Transfers:  Patient completed Sit <> Stand Transfer with stand by  assistance  with  no assistive device   Functional Mobility: SBA with a RW.    Activities of Daily Living:  Feeding:  independence  Lower Body Dressing: supervision    Cognitive/Visual Perceptual:  Cognitive/Psychosocial Skills:     -       Oriented to: Person, Place, Time, and Situation   -       Safety awareness/insight to disability: intact     Physical Exam:  BUE AROM/MMT: WNL  Overall balance = Good    AMPAC 6 Click ADL:  AMPAC Total Score: 21    Treatment & Education:  UE ROM/MMT  Bed mobility training / assessment  Functional mobility assessment  Sit/standing balance assessment  Educated on importance of sitting OOB in bedside chair to promote increased strength, endurance & breathing.  Discussed OT POC / Post-acute plan    Patient left supine with all lines intact and call button in reach    GOALS:   Multidisciplinary Problems       Occupational Therapy Goals          Problem: Occupational Therapy    Goal Priority Disciplines Outcome Interventions   Occupational Therapy Goal     OT, PT/OT Ongoing, Progressing    Description: Goals to be met by: 2/14/23    Patient will increase functional independence with ADLs by performing:    Grooming while standing at sink with Meldrim.  Toileting from toilet with Meldrim for hygiene and clothing management.   Supine to sit with Meldrim.  Toilet transfer to toilet with Modified Meldrim.                         History:     Past Medical History:   Diagnosis Date    Rectal cancer          Past Surgical History:   Procedure Laterality Date    COLONOSCOPY N/A 10/15/2021    Procedure: COLONOSCOPY;  Surgeon: DENISE Patel MD;  Location: [address]);  Service: Endoscopy;  Laterality: N/A;  per colostomy. Covid test on 10/12 at [facility]   Fully vacc Covid-19 - pg  miralax prep / Pt instructed to bring ostomy supplies   will need rapid COVID test-arrival time 9:00  10/14/21 - Pt confirmed new arrival time/ prep ins. reveiwed, Pt verbalized  understanding-     COLOSTOMY      REVISION COLOSTOMY N/A 2/6/2023    Procedure: REVISION, COLOSTOMY, ERAS low;  Surgeon: DENISE Patel MD;  Location: [facility] OR 2ND FLR;  Service: Colon and Rectal;  Laterality: N/A;    REVISION, COLOSTOMY, LAPAROSCOPIC N/A 3/7/2022    Procedure: REVISION, COLOSTOMY, LAPAROSCOPIC;  Surgeon: DENISE Patel MD;  Location: [facility] OR 2ND FLR;  Service: Colon and Rectal;  Laterality: N/A;       Time Tracking:     OT Date of Treatment: 02/07/23  OT Start Time: 1045  OT Stop Time: 1109  OT Total Time (min): 24 min    Billable Minutes:Evaluation 10  Therapeutic Activity 14    2/7/2023

## 2023-02-07 NOTE — PLAN OF CARE
Carlos MELENDEZ  Initial Discharge Assessment       Primary Care Provider: Rebeca John MD    Admission Diagnosis: Colostomy dysfunction [K94.03]  Colostomy in place [Z93.3]    Admission Date: 2/6/2023  Expected Discharge Date: 2/10/2023    Discharge Barriers Identified: None    Payor: [employer] / Plan: [place] [employer] / Product Type: Government /     Extended Emergency Contact Information  Primary Emergency Contact: Sukhwinder eNgrete  Mobile Phone: [phone]  Relation: Daughter  Preferred language: English   needed? No    Discharge Plan A: Home Health         [id number] - [place], [place] - [address] AT [facility] [address]  [address]  [place] [address]  Phone: [phone] Fax: [phone]    [id number] - [place], [place] - [address] AT [facility] [address]  [address]  [place] [address]  Phone: [phone] Fax: [phone]      Initial Assessment (most recent)       Adult Discharge Assessment - 02/07/23 1316          Discharge Assessment    Assessment Type Discharge Planning Brief Assessment     Confirmed/corrected address, phone number and insurance Yes     Confirmed Demographics Correct on Facesheet     Source of Information patient     When was your last doctors appointment? 11/08/22     Does patient/caregiver understand observation status Yes     Communicated ROLDAN with patient/caregiver Yes     Reason For Admission Colostom dysfunction     People in Home alone     Facility Arrived From: Home     Do you expect to return to your current living situation? Yes     Do you have help at home or someone to help you manage your care at home? Yes     Who are your caregiver(s) and their phone number(s)? Sukhwinder-[phone]     Prior to hospitilization cognitive status: Alert/Oriented     Current cognitive status: Alert/Oriented     Walking or Climbing Stairs ambulation difficulty, requires  equipment     Mobility Management Rolling walker     Home Accessibility wheelchair accessible     Equipment Currently Used at Home walker, rolling     Readmission within 30 days? No     Patient currently being followed by outpatient case management? No     Do you currently have service(s) that help you manage your care at home? Yes     How Many hours does patient receive services 12     Name and Contact number of agency [facility]  [phone]     Is the pt/caregiver preference to resume services with current agency Yes     Do you take prescription medications? Yes     Do you have prescription coverage? Yes     Coverage [place] [employer]     Do you have any problems affording any of your prescribed medications? No     Is the patient taking medications as prescribed? yes     Who is going to help you get home at discharge? [phone]     How do you get to doctors appointments? car, drives self     Are you on dialysis? No     Do you take coumadin? No     Discharge Plan A Home Health     DME Needed Upon Discharge  colostomy/ostomy supplies     Discharge Plan discussed with: Patient     Discharge Barriers Identified None                        Spoke to pt. Pt lives at home with self. Post hospital  stay [phone] will be pt support person and pt. has transportation at d/c with family. There have been no hospitalizations within the last 30 days per pt. Verified pt PCP and preferred pharmacy. Pt stated not on Coumadin and is not receiving dialysis. All questions answered regarding case management/ discharge planning , pt verbalized understanding. Discharge booklet with SW contact information given to pt. Pt current with [facility].     Layla Gonzalez LCSW  Case Management/[facility]  [phone]

## 2023-02-07 NOTE — PLAN OF CARE
Problem: Physical Therapy  Goal: Physical Therapy Goal  Description: Goals to be met by: 2023     Patient will increase functional independence with mobility by performin. Gait  x 400 feet with Supervision using LRAD.   2. Ascend/descend 2 flights of stairs with right Handrails Supervision using LRAD.     Outcome: Ongoing, Progressing

## 2023-02-07 NOTE — PROGRESS NOTES
COLORECTAL SURGERY DAILY PROGRESS NOTE    Subjective:  No acute events overnight. Patient resting comfortably in bed this morning. Tolerating diet with no N/V. Pain is well controlled. Ostomy pink and healthy without output.     Objective:  Recent Labs   Lab 02/06/23  1100   CREATININE 0.9      Recent Labs   Lab 02/06/23  1320   WBC 7.63   RBC 3.39*   HGB 11.1*   HCT 33.9*      *   MCH 32.7*   MCHC 32.7      No results for input(s): CRP in the last 168 hours.    Vitals:    02/07/23 0749   BP: 128/79   Pulse: 79   Resp: 20   Temp: 98 °F (36.7 °C)        Physical Exam:  Gen: no apparent distress, awake and alert  CV: regular rate/rhythm  Pulm: non-labored breathing, equal and bilateral chest rise  Abd: soft, non-distended, non-tender, incisions CDI. Ostomy healthy pink without output. Previous ostomy site with gauze   Ext: warm and well perfused, no edema  Skin: warm and dry, no lesions appreciated  Neuro: motor and sensation grossly intact and symmetric     Assessment/Plan:  67 y.o. male s/p ex lap with SAMEER and relocation of colostomy 2/7/23.     - Pain: tylenol, ibuprofen, gabapentin scheduled   - tramadol/oxy PRN  - Renal: d/c lindo, f/u void  - GI: advance to LRD, d/c mIVF   - Alvimopan    - PRN zofran  - Abx: none  - Labs: Daily CBC, CMP, Mg, Ph - replete lytes prn  - Endo: [facility]  - Home meds  - IS, PT/OT, OOBTC  - DVT ppx: lovenox    Dispo: NORA Diaz MD  General Surgery PGY-1

## 2023-02-08 LAB
ANION GAP SERPL CALC-SCNC: 8 MMOL/L (ref 8–16)
BUN SERPL-MCNC: 16 MG/DL (ref 8–23)
CALCIUM SERPL-MCNC: 8.4 MG/DL (ref 8.7–10.5)
CHLORIDE SERPL-SCNC: 106 MMOL/L (ref 95–110)
CO2 SERPL-SCNC: 24 MMOL/L (ref 23–29)
CREAT SERPL-MCNC: 0.8 MG/DL (ref 0.5–1.4)
CRP SERPL-MCNC: 214.3 MG/L (ref 0–8.2)
EST. GFR  (NO RACE VARIABLE): >60 ML/MIN/1.73 M^2
GLUCOSE SERPL-MCNC: 92 MG/DL (ref 70–110)
POTASSIUM SERPL-SCNC: 3.3 MMOL/L (ref 3.5–5.1)
SODIUM SERPL-SCNC: 138 MMOL/L (ref 136–145)

## 2023-02-08 PROCEDURE — 36415 COLL VENOUS BLD VENIPUNCTURE: CPT

## 2023-02-08 PROCEDURE — 20600001 HC STEP DOWN PRIVATE ROOM

## 2023-02-08 PROCEDURE — 25000003 PHARM REV CODE 250: Performed by: STUDENT IN AN ORGANIZED HEALTH CARE EDUCATION/TRAINING PROGRAM

## 2023-02-08 PROCEDURE — 36415 COLL VENOUS BLD VENIPUNCTURE: CPT | Performed by: STUDENT IN AN ORGANIZED HEALTH CARE EDUCATION/TRAINING PROGRAM

## 2023-02-08 PROCEDURE — 80048 BASIC METABOLIC PNL TOTAL CA: CPT | Performed by: STUDENT IN AN ORGANIZED HEALTH CARE EDUCATION/TRAINING PROGRAM

## 2023-02-08 PROCEDURE — 63600175 PHARM REV CODE 636 W HCPCS: Performed by: STUDENT IN AN ORGANIZED HEALTH CARE EDUCATION/TRAINING PROGRAM

## 2023-02-08 PROCEDURE — 86140 C-REACTIVE PROTEIN: CPT

## 2023-02-08 RX ADMIN — IBUPROFEN 800 MG: 400 TABLET ORAL at 08:02

## 2023-02-08 RX ADMIN — ALVIMOPAN 12 MG: 12 CAPSULE ORAL at 08:02

## 2023-02-08 RX ADMIN — MUPIROCIN: 20 OINTMENT TOPICAL at 08:02

## 2023-02-08 RX ADMIN — OXYCODONE 5 MG: 5 TABLET ORAL at 10:02

## 2023-02-08 RX ADMIN — ACETAMINOPHEN 650 MG: 325 TABLET ORAL at 05:02

## 2023-02-08 RX ADMIN — ATORVASTATIN CALCIUM 20 MG: 20 TABLET, FILM COATED ORAL at 08:02

## 2023-02-08 RX ADMIN — IBUPROFEN 800 MG: 400 TABLET ORAL at 01:02

## 2023-02-08 RX ADMIN — GABAPENTIN 300 MG: 300 CAPSULE ORAL at 02:02

## 2023-02-08 RX ADMIN — GABAPENTIN 300 MG: 300 CAPSULE ORAL at 08:02

## 2023-02-08 RX ADMIN — ACETAMINOPHEN 650 MG: 325 TABLET ORAL at 01:02

## 2023-02-08 RX ADMIN — IBUPROFEN 800 MG: 400 TABLET ORAL at 05:02

## 2023-02-08 RX ADMIN — ACETAMINOPHEN 650 MG: 325 TABLET ORAL at 08:02

## 2023-02-08 RX ADMIN — ASPIRIN 81 MG: 81 TABLET, CHEWABLE ORAL at 08:02

## 2023-02-08 RX ADMIN — OXYCODONE 5 MG: 5 TABLET ORAL at 08:02

## 2023-02-08 RX ADMIN — OXYCODONE HYDROCHLORIDE 10 MG: 10 TABLET ORAL at 12:02

## 2023-02-08 RX ADMIN — ENOXAPARIN SODIUM 30 MG: 30 INJECTION SUBCUTANEOUS at 05:02

## 2023-02-08 NOTE — NURSING
Consult received for ostomy education. Chart reviewed, pt has had ostomy since 2019 and is followed by DOE Oritz.   Secure chat with SILVER Nguyen RN revealed pt does not need education or supplies.   Wound care to sign off at this time      Please re consult if needed.

## 2023-02-08 NOTE — PLAN OF CARE
A&Ox4. VVS on RA. Adequate UOP per urinal. RLQ colostomy with watery dark brown output. LLQ old colostomy site with gaze and tape. ML incision with IB c/d/I. Tolerating Low fiber/residue diet well. POC reviewed with pt, verbalized understanding. Pt remains free of injury/falls. Pt currently resting comfortably, bed in low position, call light in reach. WCTM.      Problem: Adult Inpatient Plan of Care  Goal: Plan of Care Review  Outcome: Ongoing, Progressing  Goal: Patient-Specific Goal (Individualized)  Outcome: Ongoing, Progressing  Goal: Absence of Hospital-Acquired Illness or Injury  Outcome: Ongoing, Progressing

## 2023-02-08 NOTE — PROGRESS NOTES
COLORECTAL SURGERY DAILY PROGRESS NOTE    Subjective:  No acute events overnight. Patient resting comfortably in bed this morning. Tolerating diet with no N/V. Pain is well controlled. Voiding and ambulating. Ostomy pink and healthy with some stool - patient states ostomy bag was emptied twice, once with stool.     Objective:  Recent Labs   Lab 02/07/23  0811   *   K 2.9*      CO2 20*   BUN 12   CREATININE 0.9      Recent Labs   Lab 02/07/23  0811   WBC 6.31   RBC 3.09*   HGB 10.0*   HCT 29.9*      MCV 97   MCH 32.4*   MCHC 33.4      No results for input(s): CRP in the last 168 hours.    Vitals:    02/08/23 0420   BP: (!) 126/93   Pulse: 85   Resp: 18   Temp: 97.1 °F (36.2 °C)        Physical Exam:  Gen: no apparent distress, awake and alert  CV: regular rate/rhythm  Pulm: non-labored breathing, equal and bilateral chest rise  Abd: soft, non-distended, non-tender, incisions CDI. Ostomy healthy pink with some minimal stools. Previous ostomy site without erythema or purulence.   Ext: warm and well perfused, no edema  Skin: warm and dry, no lesions appreciated  Neuro: motor and sensation grossly intact and symmetric     Assessment/Plan:  67 y.o. male s/p ex lap with SAMEER and relocation of colostomy 2/7/23.     - Pain: tylenol, ibuprofen, gabapentin scheduled   - tramadol/oxy PRN  - Renal: Voiding  - GI: Continue LRD   - Alvimopan    - PRN zofran  - Abx: none  - Labs: Daily CBC, CMP, Mg, Ph - replete lytes prn  - Endo: [facility]  - Home meds  - IS, PT/OT, OOBTC  - DVT ppx: lovenox    Dispo: [facility] - possible discharge tomorrow.     Fantasma Diaz MD  General Surgery PGY-1

## 2023-02-09 VITALS
DIASTOLIC BLOOD PRESSURE: 92 MMHG | WEIGHT: 108 LBS | HEART RATE: 78 BPM | BODY MASS INDEX: 15.12 KG/M2 | OXYGEN SATURATION: 95 % | RESPIRATION RATE: 18 BRPM | SYSTOLIC BLOOD PRESSURE: 141 MMHG | HEIGHT: 71 IN | TEMPERATURE: 98 F

## 2023-02-09 LAB — CRP SERPL-MCNC: 145.8 MG/L (ref 0–8.2)

## 2023-02-09 PROCEDURE — 97116 GAIT TRAINING THERAPY: CPT | Mod: CQ

## 2023-02-09 PROCEDURE — 86140 C-REACTIVE PROTEIN: CPT | Performed by: STUDENT IN AN ORGANIZED HEALTH CARE EDUCATION/TRAINING PROGRAM

## 2023-02-09 PROCEDURE — 25000003 PHARM REV CODE 250: Performed by: STUDENT IN AN ORGANIZED HEALTH CARE EDUCATION/TRAINING PROGRAM

## 2023-02-09 PROCEDURE — 36415 COLL VENOUS BLD VENIPUNCTURE: CPT | Performed by: STUDENT IN AN ORGANIZED HEALTH CARE EDUCATION/TRAINING PROGRAM

## 2023-02-09 RX ORDER — OXYCODONE HYDROCHLORIDE 5 MG/1
5 TABLET ORAL EVERY 4 HOURS PRN
Qty: 15 TABLET | Refills: 0 | Status: SHIPPED | OUTPATIENT
Start: 2023-02-09

## 2023-02-09 RX ADMIN — GABAPENTIN 300 MG: 300 CAPSULE ORAL at 08:02

## 2023-02-09 RX ADMIN — ALVIMOPAN 12 MG: 12 CAPSULE ORAL at 08:02

## 2023-02-09 RX ADMIN — OXYCODONE HYDROCHLORIDE 10 MG: 10 TABLET ORAL at 04:02

## 2023-02-09 RX ADMIN — ASPIRIN 81 MG: 81 TABLET, CHEWABLE ORAL at 08:02

## 2023-02-09 RX ADMIN — MUPIROCIN: 20 OINTMENT TOPICAL at 08:02

## 2023-02-09 RX ADMIN — IBUPROFEN 800 MG: 400 TABLET ORAL at 05:02

## 2023-02-09 RX ADMIN — ACETAMINOPHEN 650 MG: 325 TABLET ORAL at 05:02

## 2023-02-09 NOTE — DISCHARGE SUMMARY
[facility]os [facility]  Colorectal Surgery  Discharge Summary      Patient Name: Fran Negrete  MRN: [id number]  Admission Date: 2/6/2023  Hospital Length of Stay: 3 days  Discharge Date and Time:  02/09/2023 7:33 AM  Attending Physician: DENISE Patel MD   Discharging Provider: Fantasma Diaz MD  Primary Care Provider: Rebeca John MD     HPI:  Patient is a 67 y.o. male presents with colostomy dysfunction. The patient is a established patient to this practice.      Course is as follows:  [facility]  Initially did a LAR with ileostomy.  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  He then had a completion proctectomy with left colectomy and a proximal to descend colostomy brought out.   the majority of the surgery was performed in [facility].  He had 6 surgeries in [place] for rectal cancer and a total of 13 prior abdominal surgeries.  He presents today for evaluation for stomal dysfunction.  He has narrowing of the stoma opening as well as significant granulation tissue around the stoma that is tender to touch making pouching difficult.  He recalls that a local revision was attempted in the past.  On 10/15/2021, colonoscopy was performed with excision of granulation tissue around the stoma.  This resulted in a 3-4 month improvement.  Functionally, he is independent and performs all of his activities of daily living.  Due to pouching difficulties, he has to change the pouch daily.  He normally has liquid output through his ostomy.  Weight has been stable.  Rarely smokes tobacco.  Does regularly smoke marijuana.     3/7/22:  Plan for laparoscopic revision of the colostomy.  Patient never showed.     Current status:  1/6/23:  Lost 10 lb since his prior visit in February 2022.  Worsening granulation tissue around the colostomy site resulting in increased pain.  He is having high ostomy output.  Was previously taking Imodium but ran out and did not  restart.  Frequently afraid to drink liquids an effort to avoid increased ostomy output.  Remains ambulatory at home.        Last Colonoscopy: 10/15/2021  Impression:            - Patent but strictured end colostomy with                          moderate stenosis and granuloma. Granuloma excised                          with hot snare in the mid transverse colon.                          - Non-patent side-to-side ileo-colonic                          anastomosis, characterized by healthy appearing                          mucosa.                          - Three 3 to 6 mm polyps in the transverse colon,                          removed with a cold snare. Resected and retrieved.      Procedure(s) (LRB):  REVISION, COLOSTOMY, ERAS low (N/A)     Hospital Course:  Please see the preoperative H&P and other available documentation for full details related to history prior to this admission.  Briefly, Fran Negrete is a 67 y.o. male who was admitted following scheduled elective surgery for colostomy closure.      Following a complete preoperative discussion of the risks and benefits of surgery with signed informed consent, the patient was taken to the operating room on 2/6/2023 and underwent the above stated procedures. The patient tolerated surgery well and there were no complications. Please see the operative report for full intraoperative findings and details.       Postoperatively, the patient did well and was transferred from the PACU to the floor in stable condition where they had a stable and uncomplicated hospital course.      Labs and vital signs remained stable and appropriate throughout course. Diet was advanced as tolerated and the patient's pain was controlled on oral pain medications without problem. Currently, the patient is doing well and is stable and appropriate for discharge at this time. Discharge instructions discussed with patient at bedside, all questions and concerns addressed.          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    Specialty: Colon and Rectal Surgery  Contact information:  [address] [place] [id number]  [phone]                       Patient Instructions:      Lifting restrictions   Order Comments: No lifting greater than 10 pounds for 6 weeks from day of surgery.  No pushing/pulling such as vacuuming or raking.  No straining, avoid constipation and take stool softeners as described and laxatives as needed.  No driving while on narcotics and until you can react quickly without pain.     Notify your health care provider if you experience any of the following:  temperature >100.4     Notify your health care provider if you experience any of the following:  persistent nausea and vomiting or diarrhea     Notify your health care provider if you experience any of the following:  severe uncontrolled pain     Notify your health care provider if you experience any of the following:  redness, tenderness, or signs of infection (pain, swelling, redness, odor or green/yellow discharge around incision site)     Notify your health care provider if you experience any of the following:  difficulty breathing or increased cough     Notify your health care provider if you  experience any of the following:  severe persistent headache     Notify your health care provider if you experience any of the following:  worsening rash     Notify your health care provider if you experience any of the following:  persistent dizziness, light-headedness, or visual disturbances     Notify your health care provider if you experience any of the following:  increased confusion or weakness     No dressing needed   Order Comments: Steri strips are applied on your incision. Do not remove them. They fall off on their own or be removed at your next clinic visit. You are OK to shower, do not soak or submerge in water - no swimming or baths. Shower daily.     Medications:  Reconciled Home Medications:      Medication List      START taking these medications    oxyCODONE 5 MG immediate release tablet  Commonly known as: ROXICODONE  Take 1 tablet (5 mg total) by mouth every 4 (four) hours as needed for Pain.        CONTINUE taking these medications    aspirin 81 MG Chew  CHEW ONE TABLET BY MOUTH ONCE DAILY TO PREVENT BLOOD CLOT     atorvastatin 40 MG tablet  Commonly known as: LIPITOR  TAKE ONE-HALF TABLET BY MOUTH ONCE DAILY FOR CHOLESTEROL     omeprazole 20 MG capsule  Commonly known as: PRILOSEC  Take 20 mg by mouth.     polyethylene glycol 17 gram/dose powder  Commonly known as: GLYCOLAX  Use 1 capful of Miralax per 8 ounces of clear liquid for 8 doses the night before surgery.     tadalafiL 5 MG tablet  Commonly known as: CIALIS  Take 1 tablet by mouth once daily.     vitamin E 400 UNIT capsule  TAKE ONE CAPSULE BY MOUTH ONCE DAILY AS A VITAMIN SUPPLEMENT        ASK your doctor about these medications    HYDROcodone-acetaminophen 5-325 mg per tablet  Commonly known as: NORCO  Take 1 tablet by mouth every 6 (six) hours as needed for Pain.     loperamide 2 mg capsule  Commonly known as: IMODIUM  Take 1 capsule (2 mg total) by mouth 4 (four) times daily.  Ask about: Should I take this medication?     neomycin  500 mg Tab  Commonly known as: MYCIFRADIN  Take 2 tablets at 1pm, 2pm and 11 pm.     traMADoL 50 mg tablet  Commonly known as: ULTRAM  Take 2 tablets (100 mg total) by mouth every 6 (six) hours.            Fantasma Diaz MD  Colorectal Surgery  [facility]

## 2023-02-09 NOTE — PLAN OF CARE
END OF SHIFT NOTE  Pt rested well throughout shift, no distress at this time, complaintsof pain, pain meds given x2,  VSS, ambulatory, ostomy with minimal output, pt stated that he will care for and empty ostomy. bed in lowest position, side rails up x2. Call light within reach, personal items within reach.       KELLY Davila RN         Problem: Adult Inpatient Plan of Care  Goal: Plan of Care Review  Outcome: Ongoing, Progressing  Goal: Patient-Specific Goal (Individualized)  Outcome: Ongoing, Progressing  Goal: Absence of Hospital-Acquired Illness or Injury  Outcome: Ongoing, Progressing  Goal: Optimal Comfort and Wellbeing  Outcome: Ongoing, Progressing  Goal: Readiness for Transition of Care  Outcome: Ongoing, Progressing     Problem: Infection  Goal: Absence of Infection Signs and Symptoms  Outcome: Ongoing, Progressing     Problem: Fall Injury Risk  Goal: Absence of Fall and Fall-Related Injury  Outcome: Ongoing, Progressing     Problem: Pain Acute  Goal: Acceptable Pain Control and Functional Ability  Outcome: Ongoing, Progressing

## 2023-02-09 NOTE — PLAN OF CARE
Problem: Adult Inpatient Plan of Care  Goal: Plan of Care Review  Outcome: Adequate for Care Transition  Goal: Patient-Specific Goal (Individualized)  Outcome: Adequate for Care Transition  Goal: Absence of Hospital-Acquired Illness or Injury  Outcome: Adequate for Care Transition  Goal: Optimal Comfort and Wellbeing  Outcome: Adequate for Care Transition  Goal: Readiness for Transition of Care  Outcome: Adequate for Care Transition     Problem: Infection  Goal: Absence of Infection Signs and Symptoms  Outcome: Adequate for Care Transition     Problem: Fall Injury Risk  Goal: Absence of Fall and Fall-Related Injury  Outcome: Adequate for Care Transition     Problem: Pain Acute  Goal: Acceptable Pain Control and Functional Ability  Outcome: Adequate for Care Transition

## 2023-02-09 NOTE — PT/OT/SLP PROGRESS
Physical Therapy Treatment    Patient Name:  Fran Negrete   MRN:  [id number]    Recommendations:     Discharge Recommendations: home  Discharge Equipment Recommendations: none  Barriers to discharge: None    Assessment:     Fran Negrete is a 67 y.o. male admitted with a medical diagnosis of Colostomy dysfunction.  He presents with the following impairments/functional limitations: impaired endurance, gait instability, impaired balance, impaired functional mobility. Pt tolerated treatment well, and will continue to benefit from skilled PT services to improve all deficits noted above. Resume PT POC as indicated.     Rehab Prognosis: Good; patient would benefit from acute skilled PT services to address these deficits and reach maximum level of function.    Recent Surgery: Procedure(s) (LRB):  REVISION, COLOSTOMY, ERAS low (N/A) 3 Days Post-Op    Plan:     During this hospitalization, patient to be seen 3 x/week to address the identified rehab impairments via gait training, therapeutic activities, therapeutic exercises and progress toward the following goals:    Plan of Care Expires:  03/09/23    Subjective     Chief Complaint: none stated  Patient/Family Comments/goals: none stated  Pain/Comfort:  Pain Rating 1: 0/10  Pain Rating Post-Intervention 1: 0/10      Objective:     Communicated with nursing prior to session.  Patient found HOB elevated with  (no active lines) upon PT entry to room.     General Precautions: Standard, fall  Orthopedic Precautions: N/A  Braces: N/A  Respiratory Status: Room air     Functional Mobility:  Bed Mobility:  Scooting: independence  Supine to Sit: independence  Sit to Supine: independence  Transfers:  Sit to Stand:  supervision with no AD  Gait: ~600ft no AD and SPV. No LOB noted.       AM-PAC 6 CLICK MOBILITY  Turning over in bed (including adjusting bedclothes, sheets and blankets)?: 4  Sitting down on and standing up from a chair with arms (e.g., wheelchair, bedside commode, etc.):  4  Moving from lying on back to sitting on the side of the bed?: 4  Moving to and from a bed to a chair (including a wheelchair)?: 4  Need to walk in hospital room?: 4  Climbing 3-5 steps with a railing?: 3  Basic Mobility Total Score: 23       Treatment & Education:  -All questions/concerns answered within PTA scope of practice.     Patient left HOB elevated with all lines intact, call button in reach, and nursing notified..    GOALS:   Multidisciplinary Problems       Physical Therapy Goals          Problem: Physical Therapy    Goal Priority Disciplines Outcome Goal Variances Interventions   Physical Therapy Goal     PT, PT/OT Ongoing, Progressing     Description: Goals to be met by: 2023     Patient will increase functional independence with mobility by performin. Gait  x 400 feet with Supervision using LRAD.   2. Ascend/descend 2 flights of stairs with right Handrails Supervision using LRAD.                          Time Tracking:     PT Received On: 23  PT Start Time: 901     PT Stop Time: 916  PT Total Time (min): 15 min     Billable Minutes: Gait Training 15       PT/PTA: PTA     PTA Visit Number: 1     2023

## 2023-02-09 NOTE — HOSPITAL COURSE
Please see the preoperative H&P and other available documentation for full details related to history prior to this admission.  Briefly, Fran Negrete is a 67 y.o. male who was admitted following scheduled elective surgery for colostomy closure.      Following a complete preoperative discussion of the risks and benefits of surgery with signed informed consent, the patient was taken to the operating room on 2/6/2023 and underwent the above stated procedures. The patient tolerated surgery well and there were no complications. Please see the operative report for full intraoperative findings and details.       Postoperatively, the patient did well and was transferred from the PACU to the floor in stable condition where they had a stable and uncomplicated hospital course.      Labs and vital signs remained stable and appropriate throughout course. Diet was advanced as tolerated and the patient's pain was controlled on oral pain medications without problem. Currently, the patient is doing well and is stable and appropriate for discharge at this time. Discharge instructions discussed with patient at bedside, all questions and concerns addressed.

## 2023-02-10 LAB
FINAL PATHOLOGIC DIAGNOSIS: NORMAL
GROSS: NORMAL
Lab: NORMAL

## 2023-02-15 NOTE — PHYSICIAN QUERY
PT Name: Fran Negrete  MR #: [id number]    DOCUMENTATION CLARIFICATION     CDS/: Harini Isabel RN, CDS               Contact information: [email or web address]      This form is a permanent document in the medical record.     Query Date: February 15, 2023    By submitting this query, we are merely seeking further clarification of documentation.. Please utilize your independent clinical judgment when addressing the question(s) below.    The medical record contains the following:   Indicators  Supporting Clinical Findings Location in Medical Record   x Energy Intake Tolerating diet with no N/V   CRS PN 2/8    x Weight Loss Current status:  1/6/23:  Lost 10 lb since his prior visit in February 2022 H&P 2/6     Fat Loss      Muscle Loss      Edema/Fluid Accumulation      Reduced  Strength (by dynamometer)     x Weight, BMI, Usual Body Weight BMI 15.31 kg/m²  H&P 2/6     Delayed Wound Healing      Registered Dietician Diagnosis     x Acute or Chronic Illness [facility]  Initially did a LAR with ileostomy.  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  He then had a completion proctectomy with left colectomy and a proximal to descend colostomy brought out.   the majority of the surgery was performed in [facility].  He had 6 surgeries in [place] for rectal cancer and a total of 13 prior abdominal surgeries.  He presents today for evaluation for stomal dysfunction.      PMH cachexia and protein calorie malnutrition H&P 2/6                         Anesthesia Note 2/6    Social or Environmental Circumstances      Treatment      Other       Academy of Nutrition and Dietetics (Academy) and the American Society for Parenteral and Enteral Nutrition (A.S.P.E.N.) Clinical Characteristics to support Malnutrition   Malnutrition in the Context of Acute Illness or Injury Malnutrition in the Context of Chronic Illness or Injury Malnutrition in the Context of  Social or Environmental Circumstances   Malnutrition Level Moderate Severe Moderate Severe   Moderate   Severe   Energy Intake <75%                   >7 days <50%                 >5 days <75%           >1 month <75%                      >1 month   <75% for >3 months   <50% for >1 month   Weight Loss   1-2% in 1 week >2% in 1 week 5% in 1 month >5% in 1 month 5% in 1 month >5% in 1 month    5% in 1 month >5% in 1 month 7.5% in 3 months >7.5% in 3 months 7.5% in 3 months >7.5% in 3 months    7.5% in 3 months >7.5% in 3 months 10% in 6 months >10% in 6 months 10% in 6 months >10% in 6 months        20% in 1 year                    >20% in 1 year                                                                  20% in 1 year                            >20% in 1 year                                                  Subcutaneous Fat Loss Mild  Moderate  Mild  Severe    Mild   Severe   Muscle Loss Mild  Moderate  Mild  Severe    Mild   Severe   Edema/Fluid Accumulation Mild Moderate to severe  Mild  Severe   Mild   Severe   Reduced  Strength         (based on standards supplied by  of dynamometer) N/A Measurably reduced N/A Measurably reduced N/A Measurably reduced     Criteria for mild malnutrition is defined as 1 characteristic outlined above within the established moderate or severe parameters.  A minimum of 2 out of the 6 characteristics noted above are recommended for a diagnosis of moderate or severe malnutrition.  Chronic illness/injury is a disease/condition lasting 3 months or longer.    The noted clinical guidelines are only system guidelines and do not replace the providers clinical judgment.    Provider, please specify diagnosis or diagnoses associated with above clinical findings.    [  ] Malnutrition, Unspecified degree   [ x ] Cachexia - involuntary weight loss of greater than 10% of baseline body weight characteried by muscle atrophy and depletion of lean body mass usually associated with a  chronic condition   [  ] Abnormal Weight Loss   [  ] Underweight   [  ] Other Nutritional Diagnosis (please specify): _______________       Please document in your progress notes daily for the duration of treatment until resolved and  include in your discharge summary.      References:    OSIRIS Thompson, & MACI Carballo (2022, April). Assessment and management of anorexia and cachexia in palliative care. Retrieved May 23, 2022, from https://www.365looks (Coqu[employer].me)/contents/assessment-and-management-of-anorexia-and-cachexia-in-palliative-care?hetbqVkz=1347&source=see_link     HUA Robbins, PhD, RD, Conor COFFMAN P., PhD, RN, CAROL Ferrara MD, PhD, [place]en PAYAN A., MS, RD, [facility], ANJELICA Reyes, MS, RD, The Academy Malnutrition Work Group, The A.S.P.E.N. Board of Directors. (2012). Consensus Statement: Academy of Nutrition and Dietetics and American Society for Parenteral and Enteral Nutrition: Characteristics Recommended for the Identification and Documentation of Adult Malnutrition (Undernutrition). Journal of Parenteral and Enteral Nutrition, 36(3), 275-283. doi:10.1177/5520947944062471     Form No. 03261

## 2023-02-27 ENCOUNTER — TELEPHONE (OUTPATIENT)
Dept: SURGERY | Facility: CLINIC | Age: 68
End: 2023-02-27
Payer: OTHER GOVERNMENT

## 2023-02-27 NOTE — TELEPHONE ENCOUNTER
Called patient to confirm appointment for tomorrow, patient states that he will need to reschedule he did not get to schedule his transportation. Informed patient that I would send a message to the nurse incase she needs to double book. Patient verbalized understanding.     Patient would also like to have a different prescription called in instead of Gabapentin or Oxycodone. Informed patient that I will reach out to Dr. Patel to discuss farther. Patient verbalized understanding.

## 2023-02-28 ENCOUNTER — TELEPHONE (OUTPATIENT)
Dept: SURGERY | Facility: CLINIC | Age: 68
End: 2023-02-28
Payer: OTHER GOVERNMENT

## 2023-02-28 NOTE — TELEPHONE ENCOUNTER
----- Message from Margaret Evans sent at 2/28/2023  3:40 PM CST -----  Regarding: Returning call  Pt is Requesting a Call back from Scarlett, please call to discuss further.      [phone]

## 2023-02-28 NOTE — TELEPHONE ENCOUNTER
Left voicemail with callback number in response to message. Explained that I will reschedule his post op appointment due to transportation.Also, explained that Grady was sent in to pharmacy for pain. Instructed patient to call back.

## 2023-02-28 NOTE — TELEPHONE ENCOUNTER
Spoke with patient and appointments rescheduled. Details confirmed verbally over phone and reminder slips placed in mail. Patient verbalizes understanding.

## 2023-03-01 RX ORDER — HYDROCODONE BITARTRATE AND ACETAMINOPHEN 5; 325 MG/1; MG/1
1 TABLET ORAL EVERY 6 HOURS PRN
Qty: 20 TABLET | Refills: 0 | Status: SHIPPED | OUTPATIENT
Start: 2023-03-01

## 2023-03-01 NOTE — TELEPHONE ENCOUNTER
----- Message from Clarita Gagnon sent at 3/1/2023  3:25 PM CST -----  Regarding: Refill  Contact: pt @ [phone]  Rx Refill/Request    Is this a Refill or New Rx:refill    Rx Name and Strength:HYDROcodone-acetaminophen (NORCO) 5-325 mg per tablet    Preferred Pharmacy with phone number:  [id number] [address] AT [facility] [address]  [address]  [facility] [address]  Phone: [phone] Fax: [phone]    Communication Preference:pt @ [phone]    Additional Information:

## 2023-04-25 ENCOUNTER — PATIENT MESSAGE (OUTPATIENT)
Dept: RESEARCH | Facility: HOSPITAL | Age: 68
End: 2023-04-25
Payer: OTHER GOVERNMENT

## 2023-05-02 ENCOUNTER — PATIENT MESSAGE (OUTPATIENT)
Dept: RESEARCH | Facility: HOSPITAL | Age: 68
End: 2023-05-02
Payer: OTHER GOVERNMENT

## 2023-05-16 ENCOUNTER — PATIENT MESSAGE (OUTPATIENT)
Dept: RESEARCH | Facility: HOSPITAL | Age: 68
End: 2023-05-16
Payer: OTHER GOVERNMENT

## 2025-01-16 DIAGNOSIS — Z85.09 PERSONAL HISTORY OF MALIGNANT NEOPLASM OF OTHER DIGESTIVE ORGANS: Primary | ICD-10-CM

## (undated) DEVICE — COVER LIGHT HANDLE 80/CA

## (undated) DEVICE — SUT VICRYL PLUS 3-0 SH 18IN

## (undated) DEVICE — SUT CTD VICRYL 2-0 VIL BR

## (undated) DEVICE — MARKER SKIN STND TIP BLUE BARR

## (undated) DEVICE — DRAPE CORETEMP FLD WRM 56X62IN

## (undated) DEVICE — TIP YANKAUERS BULB NO VENT

## (undated) DEVICE — SEE MEDLINE ITEM 156902

## (undated) DEVICE — ELECTRODE REM PLYHSV RETURN 9

## (undated) DEVICE — NDL BOX COUNTER

## (undated) DEVICE — DRAPE LEGGINGS CUFF 33X51IN

## (undated) DEVICE — STAPLER ECHELON PWR CIR 29MM

## (undated) DEVICE — DRAPE ABDOMINAL TIBURON 14X11

## (undated) DEVICE — SUT 0 18IN COATED VICRYL V

## (undated) DEVICE — TRAY SKIN SCRUB WET PREMIUM

## (undated) DEVICE — CUTTER PROXIMATE BLUE 75MM

## (undated) DEVICE — SUT COATED VICRYL 4/0 27IN

## (undated) DEVICE — SUT CTD VICRYL VIL BR CR/SH

## (undated) DEVICE — LUBRICANT SURGILUBE 2 OZ

## (undated) DEVICE — SUT CTD VICRYL 2-0 UND BR

## (undated) DEVICE — TRAY CATH FOL SIL URIMTR 16FR

## (undated) DEVICE — DRAPE INCISE IOBAN 2 23X17IN

## (undated) DEVICE — SUT 1 48IN PDS II VIO MONO

## (undated) DEVICE — BOWL STERILE LARGE 32OZ

## (undated) DEVICE — COVER MAYO STND XL 30X57IN

## (undated) DEVICE — SUT PDS II 0 CT-1 VIL MONO